# Patient Record
Sex: FEMALE | Race: WHITE | NOT HISPANIC OR LATINO | URBAN - METROPOLITAN AREA
[De-identification: names, ages, dates, MRNs, and addresses within clinical notes are randomized per-mention and may not be internally consistent; named-entity substitution may affect disease eponyms.]

---

## 2023-07-07 ENCOUNTER — INPATIENT (INPATIENT)
Facility: HOSPITAL | Age: 78
LOS: 0 days | Discharge: HOME | DRG: 149 | End: 2023-07-08
Attending: STUDENT IN AN ORGANIZED HEALTH CARE EDUCATION/TRAINING PROGRAM | Admitting: STUDENT IN AN ORGANIZED HEALTH CARE EDUCATION/TRAINING PROGRAM
Payer: MEDICARE

## 2023-07-07 VITALS
DIASTOLIC BLOOD PRESSURE: 90 MMHG | HEART RATE: 74 BPM | WEIGHT: 149.91 LBS | RESPIRATION RATE: 16 BRPM | TEMPERATURE: 99 F | SYSTOLIC BLOOD PRESSURE: 145 MMHG | HEIGHT: 66 IN | OXYGEN SATURATION: 99 %

## 2023-07-07 DIAGNOSIS — Z29.9 ENCOUNTER FOR PROPHYLACTIC MEASURES, UNSPECIFIED: ICD-10-CM

## 2023-07-07 DIAGNOSIS — R73.9 HYPERGLYCEMIA, UNSPECIFIED: ICD-10-CM

## 2023-07-07 DIAGNOSIS — E78.5 HYPERLIPIDEMIA, UNSPECIFIED: ICD-10-CM

## 2023-07-07 DIAGNOSIS — R42 DIZZINESS AND GIDDINESS: ICD-10-CM

## 2023-07-07 DIAGNOSIS — I10 ESSENTIAL (PRIMARY) HYPERTENSION: ICD-10-CM

## 2023-07-07 LAB
ALBUMIN SERPL ELPH-MCNC: 3.8 G/DL — SIGNIFICANT CHANGE UP (ref 3.3–5)
ALP SERPL-CCNC: 45 U/L — SIGNIFICANT CHANGE UP (ref 40–120)
ALT FLD-CCNC: 22 U/L — SIGNIFICANT CHANGE UP (ref 12–78)
ANION GAP SERPL CALC-SCNC: 7 MMOL/L — SIGNIFICANT CHANGE UP (ref 5–17)
APTT BLD: 30 SEC — SIGNIFICANT CHANGE UP (ref 27.5–35.5)
AST SERPL-CCNC: 18 U/L — SIGNIFICANT CHANGE UP (ref 15–37)
BASOPHILS # BLD AUTO: 0.03 K/UL — SIGNIFICANT CHANGE UP (ref 0–0.2)
BASOPHILS NFR BLD AUTO: 0.4 % — SIGNIFICANT CHANGE UP (ref 0–2)
BILIRUB SERPL-MCNC: 0.3 MG/DL — SIGNIFICANT CHANGE UP (ref 0.2–1.2)
BUN SERPL-MCNC: 21 MG/DL — SIGNIFICANT CHANGE UP (ref 7–23)
CALCIUM SERPL-MCNC: 9.5 MG/DL — SIGNIFICANT CHANGE UP (ref 8.5–10.1)
CHLORIDE SERPL-SCNC: 107 MMOL/L — SIGNIFICANT CHANGE UP (ref 96–108)
CO2 SERPL-SCNC: 29 MMOL/L — SIGNIFICANT CHANGE UP (ref 22–31)
CREAT SERPL-MCNC: 0.76 MG/DL — SIGNIFICANT CHANGE UP (ref 0.5–1.3)
EGFR: 82 ML/MIN/1.73M2 — SIGNIFICANT CHANGE UP
EOSINOPHIL # BLD AUTO: 0.13 K/UL — SIGNIFICANT CHANGE UP (ref 0–0.5)
EOSINOPHIL NFR BLD AUTO: 1.8 % — SIGNIFICANT CHANGE UP (ref 0–6)
GLUCOSE SERPL-MCNC: 165 MG/DL — HIGH (ref 70–99)
HCT VFR BLD CALC: 37.4 % — SIGNIFICANT CHANGE UP (ref 34.5–45)
HGB BLD-MCNC: 12.2 G/DL — SIGNIFICANT CHANGE UP (ref 11.5–15.5)
IMM GRANULOCYTES NFR BLD AUTO: 0.4 % — SIGNIFICANT CHANGE UP (ref 0–0.9)
INR BLD: 1.05 RATIO — SIGNIFICANT CHANGE UP (ref 0.88–1.16)
LYMPHOCYTES # BLD AUTO: 3.04 K/UL — SIGNIFICANT CHANGE UP (ref 1–3.3)
LYMPHOCYTES # BLD AUTO: 41.4 % — SIGNIFICANT CHANGE UP (ref 13–44)
MCHC RBC-ENTMCNC: 30.3 PG — SIGNIFICANT CHANGE UP (ref 27–34)
MCHC RBC-ENTMCNC: 32.6 GM/DL — SIGNIFICANT CHANGE UP (ref 32–36)
MCV RBC AUTO: 93 FL — SIGNIFICANT CHANGE UP (ref 80–100)
MONOCYTES # BLD AUTO: 0.66 K/UL — SIGNIFICANT CHANGE UP (ref 0–0.9)
MONOCYTES NFR BLD AUTO: 9 % — SIGNIFICANT CHANGE UP (ref 2–14)
NEUTROPHILS # BLD AUTO: 3.46 K/UL — SIGNIFICANT CHANGE UP (ref 1.8–7.4)
NEUTROPHILS NFR BLD AUTO: 47 % — SIGNIFICANT CHANGE UP (ref 43–77)
NRBC # BLD: 0 /100 WBCS — SIGNIFICANT CHANGE UP (ref 0–0)
PLATELET # BLD AUTO: 213 K/UL — SIGNIFICANT CHANGE UP (ref 150–400)
POTASSIUM SERPL-MCNC: 3.7 MMOL/L — SIGNIFICANT CHANGE UP (ref 3.5–5.3)
POTASSIUM SERPL-SCNC: 3.7 MMOL/L — SIGNIFICANT CHANGE UP (ref 3.5–5.3)
PROT SERPL-MCNC: 7.3 G/DL — SIGNIFICANT CHANGE UP (ref 6–8.3)
PROTHROM AB SERPL-ACNC: 12.3 SEC — SIGNIFICANT CHANGE UP (ref 10.5–13.4)
RBC # BLD: 4.02 M/UL — SIGNIFICANT CHANGE UP (ref 3.8–5.2)
RBC # FLD: 11.6 % — SIGNIFICANT CHANGE UP (ref 10.3–14.5)
SODIUM SERPL-SCNC: 143 MMOL/L — SIGNIFICANT CHANGE UP (ref 135–145)
TROPONIN I, HIGH SENSITIVITY RESULT: 3.5 NG/L — SIGNIFICANT CHANGE UP
WBC # BLD: 7.35 K/UL — SIGNIFICANT CHANGE UP (ref 3.8–10.5)
WBC # FLD AUTO: 7.35 K/UL — SIGNIFICANT CHANGE UP (ref 3.8–10.5)

## 2023-07-07 PROCEDURE — 70498 CT ANGIOGRAPHY NECK: CPT | Mod: 26,MA

## 2023-07-07 PROCEDURE — 99223 1ST HOSP IP/OBS HIGH 75: CPT

## 2023-07-07 PROCEDURE — 71045 X-RAY EXAM CHEST 1 VIEW: CPT | Mod: 26

## 2023-07-07 PROCEDURE — 70496 CT ANGIOGRAPHY HEAD: CPT | Mod: 26,MA

## 2023-07-07 PROCEDURE — 99291 CRITICAL CARE FIRST HOUR: CPT

## 2023-07-07 PROCEDURE — 0042T: CPT | Mod: MA

## 2023-07-07 PROCEDURE — 93010 ELECTROCARDIOGRAM REPORT: CPT

## 2023-07-07 RX ORDER — ASPIRIN/CALCIUM CARB/MAGNESIUM 324 MG
325 TABLET ORAL ONCE
Refills: 0 | Status: COMPLETED | OUTPATIENT
Start: 2023-07-07 | End: 2023-07-07

## 2023-07-07 RX ORDER — ATORVASTATIN CALCIUM 80 MG/1
40 TABLET, FILM COATED ORAL AT BEDTIME
Refills: 0 | Status: DISCONTINUED | OUTPATIENT
Start: 2023-07-07 | End: 2023-07-08

## 2023-07-07 RX ORDER — MECLIZINE HCL 12.5 MG
25 TABLET ORAL THREE TIMES A DAY
Refills: 0 | Status: DISCONTINUED | OUTPATIENT
Start: 2023-07-07 | End: 2023-07-08

## 2023-07-07 RX ORDER — ACETAMINOPHEN 500 MG
650 TABLET ORAL EVERY 6 HOURS
Refills: 0 | Status: DISCONTINUED | OUTPATIENT
Start: 2023-07-07 | End: 2023-07-08

## 2023-07-07 RX ORDER — SODIUM CHLORIDE 9 MG/ML
1000 INJECTION INTRAMUSCULAR; INTRAVENOUS; SUBCUTANEOUS
Refills: 0 | Status: DISCONTINUED | OUTPATIENT
Start: 2023-07-07 | End: 2023-07-08

## 2023-07-07 RX ORDER — LOSARTAN POTASSIUM 100 MG/1
25 TABLET, FILM COATED ORAL DAILY
Refills: 0 | Status: DISCONTINUED | OUTPATIENT
Start: 2023-07-08 | End: 2023-07-08

## 2023-07-07 RX ORDER — ONDANSETRON 8 MG/1
4 TABLET, FILM COATED ORAL EVERY 8 HOURS
Refills: 0 | Status: DISCONTINUED | OUTPATIENT
Start: 2023-07-07 | End: 2023-07-08

## 2023-07-07 RX ORDER — ONDANSETRON 8 MG/1
4 TABLET, FILM COATED ORAL ONCE
Refills: 0 | Status: COMPLETED | OUTPATIENT
Start: 2023-07-07 | End: 2023-07-07

## 2023-07-07 RX ORDER — SODIUM CHLORIDE 9 MG/ML
1000 INJECTION INTRAMUSCULAR; INTRAVENOUS; SUBCUTANEOUS ONCE
Refills: 0 | Status: COMPLETED | OUTPATIENT
Start: 2023-07-07 | End: 2023-07-07

## 2023-07-07 RX ORDER — MECLIZINE HCL 12.5 MG
25 TABLET ORAL ONCE
Refills: 0 | Status: COMPLETED | OUTPATIENT
Start: 2023-07-07 | End: 2023-07-07

## 2023-07-07 RX ORDER — LANOLIN ALCOHOL/MO/W.PET/CERES
3 CREAM (GRAM) TOPICAL AT BEDTIME
Refills: 0 | Status: DISCONTINUED | OUTPATIENT
Start: 2023-07-07 | End: 2023-07-08

## 2023-07-07 RX ADMIN — Medication 25 MILLIGRAM(S): at 21:38

## 2023-07-07 RX ADMIN — SODIUM CHLORIDE 1000 MILLILITER(S): 9 INJECTION INTRAMUSCULAR; INTRAVENOUS; SUBCUTANEOUS at 20:00

## 2023-07-07 RX ADMIN — ONDANSETRON 4 MILLIGRAM(S): 8 TABLET, FILM COATED ORAL at 22:29

## 2023-07-07 RX ADMIN — ONDANSETRON 4 MILLIGRAM(S): 8 TABLET, FILM COATED ORAL at 20:00

## 2023-07-07 RX ADMIN — Medication 325 MILLIGRAM(S): at 23:14

## 2023-07-07 RX ADMIN — SODIUM CHLORIDE 1000 MILLILITER(S): 9 INJECTION INTRAMUSCULAR; INTRAVENOUS; SUBCUTANEOUS at 22:20

## 2023-07-07 NOTE — H&P ADULT - HISTORY OF PRESENT ILLNESS
77y female with PMHx of HTN, HLD presented to the ED with acute onset dizziness and nausea/vomiting. Reports the sensation as her head spinning. Never had vertigo in the past.    In the ED,  Vital Signs T(F): 98.8 HR: 74 BP: 145/90 RR: 16 SpO2: 99%  Labs were unremarkable. Glucose 165.  CXR - no acute lobar pneumonia on personal review  CT head: No acute intracranial hemorrhage or acute territorial infarct.   CTA head/neck: No hemodynamically significant stenosis  CT perfusion: Unremarkable CT perfusion  EKG: NSR at 75bpm, low voltage QRS  Telestroke consulted by ER and recommended admission for MRI 77y female with PMHx of HTN, HLD presented to the ED with acute onset dizziness and nausea/vomiting. Reports the sensation as her head spinning. Never had vertigo in the past. States that she was sitting outside around 6PM when she had a sudden attack of the dizziness. Felt very sick and diaphoretic. States that she had 3 days of abnormal hearing in her right ear which has now resolved. No tinnitus. Feels much improved after fluids and meclizine. Now able to turn her head without getting too dizzy. Still a bit nauseous. Has unsteady gait, stating that she was not able to walk at all. Denies chest pain, palpitations, dyspnea, headache, abdominal pain, diarrhea. Patient has a remote history of smoking. States that her mother passed away from a stroke.    In the ED,  Vital Signs T(F): 98.8 HR: 74 BP: 145/90 RR: 16 SpO2: 99%  Labs were unremarkable. Glucose 165.  CXR - no acute lobar pneumonia on personal review  CT head: No acute intracranial hemorrhage or acute territorial infarct.   CTA head/neck: No hemodynamically significant stenosis  CT perfusion: Unremarkable CT perfusion  EKG: NSR at 75bpm, low voltage QRS  Telestroke consulted by ER and recommended admission for MRI 77y female with PMHx of HTN, HLD presented to the ED with acute onset dizziness and nausea/vomiting. Reports the sensation as her head spinning. Never had vertigo in the past. States that she was sitting outside around 6PM when she had a sudden attack of the dizziness. Felt very sick and diaphoretic. States that she had 3 days of abnormal hearing in her right ear which has now resolved. No tinnitus. Feels much improved after fluids and meclizine. Now able to turn her head without getting too dizzy. Still a bit nauseous. Has unsteady gait, stating that she was not able to walk at all. Denies chest pain, palpitations, dyspnea, headache, abdominal pain, diarrhea. Remote smoking history. Mother passed away from a stroke.    In the ED,  Vital Signs T(F): 98.8 HR: 74 BP: 145/90 RR: 16 SpO2: 99%  Labs were unremarkable. Glucose 165.  CXR - no acute lobar pneumonia on personal review  CT head: No acute intracranial hemorrhage or acute territorial infarct.   CTA head/neck: No hemodynamically significant stenosis  CT perfusion: Unremarkable CT perfusion  EKG: NSR at 75bpm, low voltage QRS  Telestroke consulted by ER and recommended admission for MRI

## 2023-07-07 NOTE — H&P ADULT - ASSESSMENT
77y female with PMHx of HTN, HLD admitted with dizziness and vertigo with unsteady gait. 77y female with PMHx of HTN, HLD admitted with dizziness and vertigo with unsteady gait. Likely BPPV, r/o central cause.

## 2023-07-07 NOTE — ED ADULT NURSE NOTE - OBJECTIVE STATEMENT
pt is A&Ox4, neuro intact, sensation +, motorstrength+, iv placed, diagnostic tests performed, meds given as per Md orders, resp even and unlabored, nad noted, will continue to monitor

## 2023-07-07 NOTE — ED ADULT TRIAGE NOTE - CHIEF COMPLAINT QUOTE
sudden onset of dizziness with nausea, unable to lift head- feels dizzy on movement, has vomiting and diarrhea also

## 2023-07-07 NOTE — ED ADULT NURSE NOTE - NSFALLUNIVINTERV_ED_ALL_ED
Bed/Stretcher in lowest position, wheels locked, appropriate side rails in place/Call bell, personal items and telephone in reach/Instruct patient to call for assistance before getting out of bed/chair/stretcher/Non-slip footwear applied when patient is off stretcher/[place] to call system/Physically safe environment - no spills, clutter or unnecessary equipment/Purposeful proactive rounding/Room/bathroom lighting operational, light cord in reach

## 2023-07-07 NOTE — H&P ADULT - NSHPSOCIALHISTORY_GEN_ALL_CORE
, lives at home with . Former smoker, quit many years ago. Rare/occasional wine use. No illicit drug use. Ambulates independently at baseline.

## 2023-07-07 NOTE — H&P ADULT - PROBLEM SELECTOR PLAN 1
Admit to medicine with remote telemetry  Suspect vertigo, CT imaging reviewed and negative  Persistent symptoms and unsteady gait despite medications  Will check MRI head to rule out central cause  Neurochecks q4  Check A1C and lipid profile  Meclizine TID  PT consult  Neuro evaluation Admit to medicine with remote telemetry  Suspect vertigo, CT imaging reviewed and negative  Patient reports R ear symptoms for 3 days prior to this episode (resolved hearing loss)  Persistent symptoms and unsteady gait despite medications  Will check MRI head to rule out central cause  Neurochecks q4  Check A1C and lipid profile  Meclizine TID and antiemetics PRN  Continue gentle IV fluids until tolerating diet without nausea  PT consult  Telestroke Dr. Hale consulted by ER

## 2023-07-07 NOTE — ED PROVIDER NOTE - OBJECTIVE STATEMENT
Patient is a 77-year-old female with past medical history hypertension and hyperlipidemia brought in by EMS for sudden onset of dizziness described as spinning sensation worse with movement head with associated nausea and vomiting.  Patient states symptoms started at 6:30 PM while she was sitting outside as per family patient states he has not been working for the last 3 days.  Patient denies any chest pain shortness of breath abdominal pain fever chills blurry vision numbness tingling weakness trouble with speech or gait.  Patient states she is never had the symptoms before.  Patient denies any trauma or falls no blood thinner use.

## 2023-07-07 NOTE — ED ADULT NURSE REASSESSMENT NOTE - NSFALLRISKINTERV_ED_ALL_ED

## 2023-07-07 NOTE — H&P ADULT - NSHPPHYSICALEXAM_GEN_ALL_CORE
T(C): 37.1 (07-07-23 @ 19:57), Max: 37.1 (07-07-23 @ 19:57)  HR: 74 (07-07-23 @ 19:57) (74 - 74)  BP: 145/90 (07-07-23 @ 19:57) (145/90 - 145/90)  RR: 16 (07-07-23 @ 19:57) (16 - 16)  SpO2: 99% (07-07-23 @ 19:57) (99% - 99%)    General: No apparent distress, pleasant  Head: normocephalic, atraumatic  Eyes: EOMI, anicteric, NO nystagmus  ENT: R ear TM intact, moist mucous membranes, no pharyngeal exudates  Heart: RRR, S1, S2, no murmurs  Chest: CTA b/l, no rales, rhonchi, or wheezes  Abd: BS+, soft, NT, ND  Back: no tenderness or deformity  Extr: no edema or cyanosis  Skin: warm, well perfused  Neuro: AA&Ox3, no focal weakness, sensation to light touch intact  Psych: normal affect

## 2023-07-07 NOTE — ED PROVIDER NOTE - PROGRESS NOTE DETAILS
Patient with some persistent vertigo, otherwise no acute findings.  PA discussed with telestroke, Dr. Hale.  They recommend aspirin, admission for MRI.  They do not recommend tenecteplase at this time. Patient with some improvement, still with persistent symptoms we will continue to monitor. Discussed with Dr. Talbot, will see patient to admit.

## 2023-07-07 NOTE — H&P ADULT - NSHPREVIEWOFSYSTEMS_GEN_ALL_CORE
General: no fever, chills; ADMITS malaise and diaphoresis  Eyes: no vision changes  ENT: no nasal congestion, or sore throat; ADMITTED transient hearing changes in R ear (now resolved)  CV: no chest pain or palpitations  Pulm: no SOB, wheezing, cough, or hemoptysis  Abd/GI: no diarrhea, constipation, abd pain; ADMITS n/v  : no dysuria, hematuria, urinary frequency  MSK: no joint pain or myalgias  Neuro: no syncope, focal weakness; ADMITS dizziness & vertigo  Psych: no anxiety or depression  Endo: no heat or cold intolerance

## 2023-07-07 NOTE — ED PROVIDER NOTE - CLINICAL SUMMARY MEDICAL DECISION MAKING FREE TEXT BOX
77-year-old female with a history of hypertension, hyperlipidemia presents with sudden onset of dizziness which she describes as spinning, worse with movement at 6:30 PM today.  Some nausea and vomiting associated with symptoms.  No chest pain or shortness of breath.  No acute headache.  No numbness/tingling/focal weakness.  No recent fall or trauma.  No neck pain or stiffness.  No photophobia.  No cough/URI.  No known COVID exposure.  No aggravating or alleviating factors otherwise noted.  No other acute injury or complaints.  Exam: Nontoxic, well-appearing.  CTA BL, no W/R/R.  Normal cardiac exam.  Abdomen soft, nontender, nondistended.  Normal nonfocal detailed neurologic exam.  EOMI.  Some nystagmus with right lateral gaze, horizontal.  Normal distal strength and sensation equal bilaterally.  2+ pulses.  Normal cap refill.  No other acute findings on exam.  NIHSS equals 0  Acute vertigo x630, otherwise neurologically intact with a normal NIH stroke scale.  Will check labs, CT/CT angio, IV, admit.

## 2023-07-08 ENCOUNTER — TRANSCRIPTION ENCOUNTER (OUTPATIENT)
Age: 78
End: 2023-07-08

## 2023-07-08 VITALS
OXYGEN SATURATION: 98 % | DIASTOLIC BLOOD PRESSURE: 79 MMHG | RESPIRATION RATE: 18 BRPM | TEMPERATURE: 98 F | SYSTOLIC BLOOD PRESSURE: 163 MMHG | HEART RATE: 76 BPM

## 2023-07-08 DIAGNOSIS — Z90.711 ACQUIRED ABSENCE OF UTERUS WITH REMAINING CERVICAL STUMP: Chronic | ICD-10-CM

## 2023-07-08 DIAGNOSIS — Z98.890 OTHER SPECIFIED POSTPROCEDURAL STATES: Chronic | ICD-10-CM

## 2023-07-08 LAB
A1C WITH ESTIMATED AVERAGE GLUCOSE RESULT: 5.5 % — SIGNIFICANT CHANGE UP (ref 4–5.6)
ALBUMIN SERPL ELPH-MCNC: 3.4 G/DL — SIGNIFICANT CHANGE UP (ref 3.3–5)
ALP SERPL-CCNC: 41 U/L — SIGNIFICANT CHANGE UP (ref 40–120)
ALT FLD-CCNC: 21 U/L — SIGNIFICANT CHANGE UP (ref 12–78)
ANION GAP SERPL CALC-SCNC: 6 MMOL/L — SIGNIFICANT CHANGE UP (ref 5–17)
AST SERPL-CCNC: 16 U/L — SIGNIFICANT CHANGE UP (ref 15–37)
BASOPHILS # BLD AUTO: 0.03 K/UL — SIGNIFICANT CHANGE UP (ref 0–0.2)
BASOPHILS NFR BLD AUTO: 0.4 % — SIGNIFICANT CHANGE UP (ref 0–2)
BILIRUB SERPL-MCNC: 0.3 MG/DL — SIGNIFICANT CHANGE UP (ref 0.2–1.2)
BUN SERPL-MCNC: 14 MG/DL — SIGNIFICANT CHANGE UP (ref 7–23)
CALCIUM SERPL-MCNC: 8.9 MG/DL — SIGNIFICANT CHANGE UP (ref 8.5–10.1)
CHLORIDE SERPL-SCNC: 108 MMOL/L — SIGNIFICANT CHANGE UP (ref 96–108)
CHOLEST SERPL-MCNC: 256 MG/DL — HIGH
CO2 SERPL-SCNC: 30 MMOL/L — SIGNIFICANT CHANGE UP (ref 22–31)
CREAT SERPL-MCNC: 0.76 MG/DL — SIGNIFICANT CHANGE UP (ref 0.5–1.3)
EGFR: 82 ML/MIN/1.73M2 — SIGNIFICANT CHANGE UP
EOSINOPHIL # BLD AUTO: 0.06 K/UL — SIGNIFICANT CHANGE UP (ref 0–0.5)
EOSINOPHIL NFR BLD AUTO: 0.8 % — SIGNIFICANT CHANGE UP (ref 0–6)
ESTIMATED AVERAGE GLUCOSE: 111 MG/DL — SIGNIFICANT CHANGE UP (ref 68–114)
FOLATE SERPL-MCNC: >20 NG/ML — SIGNIFICANT CHANGE UP
GLUCOSE SERPL-MCNC: 144 MG/DL — HIGH (ref 70–99)
HCT VFR BLD CALC: 35.3 % — SIGNIFICANT CHANGE UP (ref 34.5–45)
HCV AB S/CO SERPL IA: 0.05 S/CO — SIGNIFICANT CHANGE UP (ref 0–0.99)
HCV AB SERPL-IMP: SIGNIFICANT CHANGE UP
HDLC SERPL-MCNC: 55 MG/DL — SIGNIFICANT CHANGE UP
HGB BLD-MCNC: 11.7 G/DL — SIGNIFICANT CHANGE UP (ref 11.5–15.5)
IMM GRANULOCYTES NFR BLD AUTO: 0.4 % — SIGNIFICANT CHANGE UP (ref 0–0.9)
LIPID PNL WITH DIRECT LDL SERPL: 177 MG/DL — HIGH
LYMPHOCYTES # BLD AUTO: 1.5 K/UL — SIGNIFICANT CHANGE UP (ref 1–3.3)
LYMPHOCYTES # BLD AUTO: 19.5 % — SIGNIFICANT CHANGE UP (ref 13–44)
MAGNESIUM SERPL-MCNC: 2.1 MG/DL — SIGNIFICANT CHANGE UP (ref 1.6–2.6)
MCHC RBC-ENTMCNC: 30.9 PG — SIGNIFICANT CHANGE UP (ref 27–34)
MCHC RBC-ENTMCNC: 33.1 GM/DL — SIGNIFICANT CHANGE UP (ref 32–36)
MCV RBC AUTO: 93.1 FL — SIGNIFICANT CHANGE UP (ref 80–100)
MONOCYTES # BLD AUTO: 0.44 K/UL — SIGNIFICANT CHANGE UP (ref 0–0.9)
MONOCYTES NFR BLD AUTO: 5.7 % — SIGNIFICANT CHANGE UP (ref 2–14)
NEUTROPHILS # BLD AUTO: 5.63 K/UL — SIGNIFICANT CHANGE UP (ref 1.8–7.4)
NEUTROPHILS NFR BLD AUTO: 73.2 % — SIGNIFICANT CHANGE UP (ref 43–77)
NON HDL CHOLESTEROL: 201 MG/DL — HIGH
NRBC # BLD: 0 /100 WBCS — SIGNIFICANT CHANGE UP (ref 0–0)
PHOSPHATE SERPL-MCNC: 2.6 MG/DL — SIGNIFICANT CHANGE UP (ref 2.5–4.5)
PLATELET # BLD AUTO: 193 K/UL — SIGNIFICANT CHANGE UP (ref 150–400)
POTASSIUM SERPL-MCNC: 3.4 MMOL/L — LOW (ref 3.5–5.3)
POTASSIUM SERPL-SCNC: 3.4 MMOL/L — LOW (ref 3.5–5.3)
PROT SERPL-MCNC: 6.9 G/DL — SIGNIFICANT CHANGE UP (ref 6–8.3)
RBC # BLD: 3.79 M/UL — LOW (ref 3.8–5.2)
RBC # FLD: 11.7 % — SIGNIFICANT CHANGE UP (ref 10.3–14.5)
SODIUM SERPL-SCNC: 144 MMOL/L — SIGNIFICANT CHANGE UP (ref 135–145)
TRIGL SERPL-MCNC: 118 MG/DL — SIGNIFICANT CHANGE UP
TSH SERPL-MCNC: 1.32 UIU/ML — SIGNIFICANT CHANGE UP (ref 0.36–3.74)
VIT B12 SERPL-MCNC: 1493 PG/ML — HIGH (ref 232–1245)
WBC # BLD: 7.69 K/UL — SIGNIFICANT CHANGE UP (ref 3.8–10.5)
WBC # FLD AUTO: 7.69 K/UL — SIGNIFICANT CHANGE UP (ref 3.8–10.5)

## 2023-07-08 PROCEDURE — 70551 MRI BRAIN STEM W/O DYE: CPT | Mod: MG

## 2023-07-08 PROCEDURE — 86803 HEPATITIS C AB TEST: CPT

## 2023-07-08 PROCEDURE — 70551 MRI BRAIN STEM W/O DYE: CPT | Mod: 26

## 2023-07-08 PROCEDURE — 82746 ASSAY OF FOLIC ACID SERUM: CPT

## 2023-07-08 PROCEDURE — 93005 ELECTROCARDIOGRAM TRACING: CPT

## 2023-07-08 PROCEDURE — 85025 COMPLETE CBC W/AUTO DIFF WBC: CPT

## 2023-07-08 PROCEDURE — 83036 HEMOGLOBIN GLYCOSYLATED A1C: CPT

## 2023-07-08 PROCEDURE — 96361 HYDRATE IV INFUSION ADD-ON: CPT

## 2023-07-08 PROCEDURE — 84100 ASSAY OF PHOSPHORUS: CPT

## 2023-07-08 PROCEDURE — 82962 GLUCOSE BLOOD TEST: CPT

## 2023-07-08 PROCEDURE — 70498 CT ANGIOGRAPHY NECK: CPT | Mod: MA

## 2023-07-08 PROCEDURE — 70450 CT HEAD/BRAIN W/O DYE: CPT | Mod: MA

## 2023-07-08 PROCEDURE — 0042T: CPT | Mod: MA

## 2023-07-08 PROCEDURE — 80053 COMPREHEN METABOLIC PANEL: CPT

## 2023-07-08 PROCEDURE — 82607 VITAMIN B-12: CPT

## 2023-07-08 PROCEDURE — G0378: CPT

## 2023-07-08 PROCEDURE — 80061 LIPID PANEL: CPT

## 2023-07-08 PROCEDURE — 84443 ASSAY THYROID STIM HORMONE: CPT

## 2023-07-08 PROCEDURE — G1004: CPT

## 2023-07-08 PROCEDURE — 96376 TX/PRO/DX INJ SAME DRUG ADON: CPT

## 2023-07-08 PROCEDURE — 97161 PT EVAL LOW COMPLEX 20 MIN: CPT

## 2023-07-08 PROCEDURE — 84484 ASSAY OF TROPONIN QUANT: CPT

## 2023-07-08 PROCEDURE — 36415 COLL VENOUS BLD VENIPUNCTURE: CPT

## 2023-07-08 PROCEDURE — 99291 CRITICAL CARE FIRST HOUR: CPT | Mod: 25

## 2023-07-08 PROCEDURE — 70496 CT ANGIOGRAPHY HEAD: CPT | Mod: MA

## 2023-07-08 PROCEDURE — 96374 THER/PROPH/DIAG INJ IV PUSH: CPT

## 2023-07-08 PROCEDURE — 85730 THROMBOPLASTIN TIME PARTIAL: CPT

## 2023-07-08 PROCEDURE — 83735 ASSAY OF MAGNESIUM: CPT

## 2023-07-08 PROCEDURE — 71045 X-RAY EXAM CHEST 1 VIEW: CPT

## 2023-07-08 PROCEDURE — 85610 PROTHROMBIN TIME: CPT

## 2023-07-08 RX ORDER — MECLIZINE HCL 12.5 MG
1 TABLET ORAL
Qty: 15 | Refills: 0
Start: 2023-07-08

## 2023-07-08 RX ORDER — ENOXAPARIN SODIUM 100 MG/ML
40 INJECTION SUBCUTANEOUS EVERY 24 HOURS
Refills: 0 | Status: DISCONTINUED | OUTPATIENT
Start: 2023-07-08 | End: 2023-07-08

## 2023-07-08 RX ORDER — ONDANSETRON 8 MG/1
1 TABLET, FILM COATED ORAL
Qty: 2 | Refills: 0
Start: 2023-07-08

## 2023-07-08 RX ADMIN — LOSARTAN POTASSIUM 25 MILLIGRAM(S): 100 TABLET, FILM COATED ORAL at 06:32

## 2023-07-08 RX ADMIN — Medication 650 MILLIGRAM(S): at 07:27

## 2023-07-08 RX ADMIN — Medication 25 MILLIGRAM(S): at 06:32

## 2023-07-08 RX ADMIN — Medication 25 MILLIGRAM(S): at 13:10

## 2023-07-08 RX ADMIN — ENOXAPARIN SODIUM 40 MILLIGRAM(S): 100 INJECTION SUBCUTANEOUS at 13:09

## 2023-07-08 RX ADMIN — SODIUM CHLORIDE 75 MILLILITER(S): 9 INJECTION INTRAMUSCULAR; INTRAVENOUS; SUBCUTANEOUS at 06:32

## 2023-07-08 RX ADMIN — Medication 650 MILLIGRAM(S): at 06:32

## 2023-07-08 NOTE — DISCHARGE NOTE PROVIDER - CARE PROVIDER_API CALL
[facility]  [address] [place] [address]  Phone: [phone]  Fax: [phone]  Follow Up Time:

## 2023-07-08 NOTE — DISCHARGE NOTE NURSING/CASE MANAGEMENT/SOCIAL WORK - PATIENT PORTAL LINK FT
You can access the FollowMyHealth Patient Portal offered by [facility] by registering at the following website: [email or web address]. By joining [facility]’s FollowMyHealth portal, you will also be able to view your health information using other applications (apps) compatible with our system.

## 2023-07-08 NOTE — PHYSICAL THERAPY INITIAL EVALUATION ADULT - NSPTDISCHREC_GEN_A_CORE
Patient demonstrating safe ambulation. Patient appears to be performing at functional baseline and will not be placed on PT program. Pt encouraged to ambulate on unit with nursing staff. Pt verbalized understanding.

## 2023-07-08 NOTE — PHYSICAL THERAPY INITIAL EVALUATION ADULT - PERTINENT HX OF CURRENT PROBLEM, REHAB EVAL
Patient is a 77 year old female admitted with acute onset dizziness and nausea/vomiting. PMH HTN, HLD. MR Head: No acute infarct, hemorrhage, or mass effect.

## 2023-07-08 NOTE — PATIENT PROFILE ADULT - FALL HARM RISK - HARM RISK INTERVENTIONS
Assistance with ambulation/Assistance OOB with selected safe patient handling equipment/Communicate Risk of Fall with Harm to all staff/Monitor gait and stability/Reinforce activity limits and safety measures with patient and family/Sit up slowly, dangle for a short time, stand at bedside before walking/Tailored Fall Risk Interventions/Visual Cue: Yellow wristband and red socks/Bed in lowest position, wheels locked, appropriate side rails in place/Call bell, personal items and telephone in reach/Instruct patient to call for assistance before getting out of bed or chair/Non-slip footwear when patient is out of bed/[place] to call system/Physically safe environment - no spills, clutter or unnecessary equipment/Purposeful Proactive Rounding/Room/bathroom lighting operational, light cord in reach

## 2023-07-08 NOTE — PATIENT PROFILE ADULT - NSPROIMPLANTSMEDDEV_GEN_A_NUR
Right ankle removal of painful hardware. See operative report for complete details. right breast tag from biopsy

## 2023-07-08 NOTE — PHYSICAL THERAPY INITIAL EVALUATION ADULT - ADDITIONAL COMMENTS
Patient lives with  in a private house, 3 steps to enter with bilateral handrails. Pt can stay on first level. Patient reports being independent in ADLs and ambulation prior to admission. Pt owns [employer].

## 2023-07-08 NOTE — ED ADULT NURSE REASSESSMENT NOTE - NS ED NURSE REASSESS COMMENT FT1
Pt states feeling better.  Await MRi.
11
Assumed care of Pt from previous RN, Pt more comfortable at this time, not feeling dizzy, resting on stretcher, awaiting admission bed placement, VSS, will continue to monitor closely.

## 2023-07-08 NOTE — DISCHARGE NOTE PROVIDER - HOSPITAL COURSE
77y female with PMHx of HTN, HLD presented to the ED with acute onset dizziness and nausea/vomiting. Reports the sensation as her head spinning. Never had vertigo in the past. States that she was sitting outside around 6PM when she had a sudden attack of the dizziness. Felt very sick and diaphoretic. States that she had 3 days of abnormal hearing in her right ear which has now resolved. No tinnitus. Feels much improved after fluids and meclizine. Now able to turn her head without getting too dizzy. Still a bit nauseous. Has unsteady gait, stating that she was not able to walk at all. Denies chest pain, palpitations, dyspnea, headache, abdominal pain, diarrhea. Remote smoking history. Mother passed away from a stroke.  Telestroke consulted by ER and recommended admission for MRI    LABS:             11.7   7.69  )-----------( 193      ( 08 Jul 2023 09:50 )             35.3   07-08  144  |  108  |  14  ----------------------------<  144<H>  3.4<L>   |  30  |  0.76  Ca    8.9      08 Jul 2023 09:50  Phos  2.6     07-08  Mg     2.1     07-08  TPro  6.9  /  Alb  3.4  /  TBili  0.3  /  DBili  x   /  AST  16  /  ALT  21  /  AlkPhos  41  07-08  PT/INR - ( 07 Jul 2023 20:05 )   PT: 12.3 sec;   INR: 1.05 ratio    PTT - ( 07 Jul 2023 20:05 )  PTT:30.0 sec  CAPILLARY BLOOD GLUCOSE  POCT Blood Glucose.: 161 mg/dL (07 Jul 2023 20:03)  Urinalysis Basic - ( 08 Jul 2023 09:50 )  Color: x / Appearance: x / SG: x / pH: x  Gluc: 144 mg/dL / Ketone: x  / Bili: x / Urobili: x   Blood: x / Protein: x / Nitrite: x   Leuk Esterase: x / RBC: x / WBC x   Sq Epi: x / Non Sq Epi: x / Bacteria: x

## 2023-07-08 NOTE — DISCHARGE NOTE NURSING/CASE MANAGEMENT/SOCIAL WORK - NSDCPEFALRISK_GEN_ALL_CORE
For information on Fall & Injury Prevention, visit: [email or web address] OR  [email or web address] OR  https://www.cdc.gov/steadi/patient.html

## 2023-07-08 NOTE — DISCHARGE NOTE PROVIDER - NSDCMRMEDTOKEN_GEN_ALL_CORE_FT
meclizine 25 mg oral tablet: 1 tab(s) orally 3 times a day as needed for  dizziness  ondansetron 4 mg oral tablet, disintegratin tab(s) orally 4 times a day as needed for  nausea  rosuvastatin 10 mg oral tablet: 1 tab(s) orally once a day  telmisartan 20 mg oral tablet: 1 tab(s) orally once a day

## 2023-07-08 NOTE — PATIENT PROFILE ADULT - VISION (WITH CORRECTIVE LENSES IF THE PATIENT USUALLY WEARS THEM):
Partially impaired: cannot see medication labels or newsprint, but can see obstacles in path, and the surrounding layout; can count fingers at arm's length
Brandie Luo-dtr

## 2023-07-08 NOTE — PHYSICAL THERAPY INITIAL EVALUATION ADULT - PATIENT PROFILE REVIEW, REHAB EVAL
PT orders received: out of bed with assistance. Consult with PING Wilburn, pt may participate in PT evaluation./yes

## 2023-07-10 PROBLEM — Z00.00 ENCOUNTER FOR PREVENTIVE HEALTH EXAMINATION: Status: ACTIVE | Noted: 2023-07-10

## 2023-07-10 PROBLEM — I10 ESSENTIAL (PRIMARY) HYPERTENSION: Chronic | Status: ACTIVE | Noted: 2023-07-08

## 2023-07-10 PROBLEM — E78.5 HYPERLIPIDEMIA, UNSPECIFIED: Chronic | Status: ACTIVE | Noted: 2023-07-08

## 2023-07-11 ENCOUNTER — NON-APPOINTMENT (OUTPATIENT)
Age: 78
End: 2023-07-11

## 2023-07-11 ENCOUNTER — APPOINTMENT (OUTPATIENT)
Dept: OTOLARYNGOLOGY | Facility: CLINIC | Age: 78
End: 2023-07-11
Payer: MEDICARE

## 2023-07-11 VITALS
WEIGHT: 150 LBS | SYSTOLIC BLOOD PRESSURE: 147 MMHG | DIASTOLIC BLOOD PRESSURE: 82 MMHG | HEART RATE: 79 BPM | BODY MASS INDEX: 24.11 KG/M2 | HEIGHT: 66 IN

## 2023-07-11 DIAGNOSIS — R42 DIZZINESS AND GIDDINESS: ICD-10-CM

## 2023-07-11 DIAGNOSIS — H91.91 UNSPECIFIED HEARING LOSS, RIGHT EAR: ICD-10-CM

## 2023-07-11 DIAGNOSIS — H61.21 IMPACTED CERUMEN, RIGHT EAR: ICD-10-CM

## 2023-07-11 PROCEDURE — G0268 REMOVAL OF IMPACTED WAX MD: CPT

## 2023-07-11 PROCEDURE — 92557 COMPREHENSIVE HEARING TEST: CPT

## 2023-07-11 PROCEDURE — 92550 TYMPANOMETRY & REFLEX THRESH: CPT

## 2023-07-11 PROCEDURE — 99204 OFFICE O/P NEW MOD 45 MIN: CPT | Mod: 25

## 2023-07-11 NOTE — HISTORY OF PRESENT ILLNESS
[de-identified] : Patient presents stating that her right ear felt clogged for 2 weeks, then she started experiencing dizziness along with the clogged feeling so she went to [facility] on Saturday. She states she had brain scan done at the hospital and it didn’t show anything. Patient was given Meclizine(last time she took it was on Sunday). She states she is not dizzy anymore. Patient states many years ago she had an episode of vertigo. She adds her hearing now seems affected. Patient states when she lays on her left side she cant hear out of her right ear. Denies tinnitus.

## 2023-07-11 NOTE — ASSESSMENT
[FreeTextEntry1] : Reviewed and reconciled medications, allergies, PMHx, PSHx, SocHx, FMHx \par \par Patient presents stating that her right ear felt clogged for 2 weeks, then she started experiencing dizziness along with the clogged feeling so she went to [facility] on Saturday. She states she had brain scan done at the hospital and it didn’t show anything. Patient was given Meclizine(last time she took it was on Sunday). She states she is not dizzy anymore. Patient states many years ago she had an episode of vertigo. She adds her hearing now seems affected. Patient states when she lays on her left side she cant hear out of her right ear. Denies tinnitus.\par \par CT Head and CTA head and neck 07/07/23:\par -normal\par \par MRI Head no contrast 07/08/23:\par -normal \par \par Physical Exam:\par -right ear: narrow ear canal. cerumen removed via curettage \par -left ear: larger canal, minimal wax \par -deviated septum\par -mildly inflamed turbinates \par -no nystagmus\par -horizontal head roll: negative\par -vertical head roll: negative\par -romberg: negative romberg but unsteady, more to the right than the left\par \par Audio:\par -right ear: 96% at 70 dB\par -left ear: 100% at 65 dB\par Tymps: Type a, au\par ETF: abnormal, au \par Right: severe rising to mild to moderate ess snhl 250-8khz\par \par Plan: cerumen removed bilaterally. Audio - results interpreted by Dr. Mccormick and reviewed with the patient. Prednisone prescribed. Steroid calender provided. FU 2 weeks.

## 2023-07-11 NOTE — PHYSICAL EXAM
[Midline] : trachea located in midline position [Normal] : no nystagmus [FreeTextEntry8] : narrow ear canal. cerumen removed via curettage  [FreeTextEntry9] : larger canal, minimal wax  [FreeTextEntry1] : -deviated septum\par -mildly inflamed turbinates  [] : Romberg test is negative [de-identified] : no nystagmus [de-identified] : negative romberg but unsteady, more to the right than the left\par

## 2023-07-11 NOTE — DATA REVIEWED
[de-identified] : Tymps: Type a, au\par ETF: abnormal, au \par Right: severe rising to mild to moderate ess snhl 250-8khz\par left: wnl to mild snhl 250-8khz

## 2023-07-11 NOTE — CONSULT LETTER
[Dear  ___] : Dear  [unfilled], [Consult Letter:] : I had the pleasure of evaluating your patient, [unfilled]. [Please see my note below.] : Please see my note below. [Consult Closing:] : Thank you very much for allowing me to participate in the care of this patient.  If you have any questions, please do not hesitate to contact me. [Sincerely,] : Sincerely, [FreeTextEntry1] : Julio Mccormick MD FACS

## 2023-07-31 ENCOUNTER — APPOINTMENT (OUTPATIENT)
Dept: OTOLARYNGOLOGY | Facility: CLINIC | Age: 78
End: 2023-07-31

## 2023-08-22 ENCOUNTER — APPOINTMENT (OUTPATIENT)
Dept: OTOLARYNGOLOGY | Facility: CLINIC | Age: 78
End: 2023-08-22
Payer: MEDICARE

## 2023-08-22 VITALS
WEIGHT: 150 LBS | HEIGHT: 66 IN | DIASTOLIC BLOOD PRESSURE: 84 MMHG | SYSTOLIC BLOOD PRESSURE: 134 MMHG | HEART RATE: 85 BPM | BODY MASS INDEX: 24.11 KG/M2

## 2023-08-22 PROCEDURE — 99213 OFFICE O/P EST LOW 20 MIN: CPT

## 2023-08-22 NOTE — CONSULT LETTER
[Dear  ___] : Dear  [unfilled], [Courtesy Letter:] : I had the pleasure of seeing your patient, [unfilled], in my office today. [Please see my note below.] : Please see my note below. [Consult Closing:] : Thank you very much for allowing me to participate in the care of this patient.  If you have any questions, please do not hesitate to contact me. [Sincerely,] : Sincerely, [FreeTextEntry3] : Julio Mccormick MD FACS

## 2023-08-22 NOTE — HISTORY OF PRESENT ILLNESS
[de-identified] : Ms. Martinez is a 77 year old lady who presents today due to PND on the right side with clogged feeling on the right ear with popping sensation. She sometimes feels her left ear is clogged too. She takes seudafed to relieve the PND.

## 2023-08-22 NOTE — ASSESSMENT
[FreeTextEntry1] : Reviewed and Reconciled the pmhx, fmhx, sochx, and medhx Ms. Martinez is a 77 year old lady who presents today due to PND on the right side and clogged feeling on the right ear with popping sensation. She sometimes feels her left ear is clogged too. She takes seudafed to relieve the PND. She took prednisone. She does not use nasal spray. She sometimes feels slightly off balance.   Physical Exam: deviated septum right inflamed turbinates   Plan: Dymista - 1 spray bilaterally BID, spray laterally. FU 6 weeks

## 2023-08-22 NOTE — ADDENDUM
[FreeTextEntry1] : Documented by Joy Addison acting as a scribe for Dr. Mccormick on [mm//dd/yyyy].   All Medical record entries made by the scribe were at my, Dr. Mccormick, direction and personally directed by me on 08/22/2023. I have reviewed the chart and agree that the record accurately reflects my personal performance of the history, physical exam, assessment, and plan. I have also personally directed, reviewed, and agreed with the discharge instructions.

## 2023-08-22 NOTE — PHYSICAL EXAM
[Hearing Loss Right Only] : normal [Hearing Loss Left Only] : normal [Hearing Nguyen Test (Tuning Fork On Forehead)] : no lateralization of tone [de-identified] : deviated septum right inflamed turbinates  [Normal] : mucosa is normal [Midline] : trachea located in midline position

## 2023-08-23 RX ORDER — AZELASTINE HYDROCHLORIDE 137 UG/1
137 SPRAY, METERED NASAL TWICE DAILY
Qty: 3 | Refills: 3 | Status: ACTIVE | COMMUNITY
Start: 2023-08-23 | End: 1900-01-01

## 2023-10-06 ENCOUNTER — APPOINTMENT (OUTPATIENT)
Dept: OTOLARYNGOLOGY | Facility: CLINIC | Age: 78
End: 2023-10-06
Payer: MEDICARE

## 2023-10-06 VITALS
HEIGHT: 66 IN | HEART RATE: 80 BPM | SYSTOLIC BLOOD PRESSURE: 139 MMHG | WEIGHT: 152 LBS | BODY MASS INDEX: 24.43 KG/M2 | DIASTOLIC BLOOD PRESSURE: 77 MMHG

## 2023-10-06 DIAGNOSIS — H91.21 SUDDEN IDIOPATHIC HEARING LOSS, RIGHT EAR: ICD-10-CM

## 2023-10-06 PROCEDURE — 99213 OFFICE O/P EST LOW 20 MIN: CPT

## 2023-10-06 RX ORDER — FLUTICASONE PROPIONATE 50 UG/1
50 SPRAY, METERED NASAL
Qty: 3 | Refills: 3 | Status: ACTIVE | COMMUNITY
Start: 2023-08-23 | End: 1900-01-01

## 2023-10-12 ENCOUNTER — RESULT REVIEW (OUTPATIENT)
Age: 78
End: 2023-10-12

## 2023-10-12 ENCOUNTER — APPOINTMENT (OUTPATIENT)
Dept: ORTHOPEDIC SURGERY | Facility: CLINIC | Age: 78
End: 2023-10-12
Payer: MEDICARE

## 2023-10-12 VITALS — HEIGHT: 66 IN | WEIGHT: 152 LBS | BODY MASS INDEX: 24.43 KG/M2

## 2023-10-12 DIAGNOSIS — M47.26 OTHER SPONDYLOSIS WITH RADICULOPATHY, LUMBAR REGION: ICD-10-CM

## 2023-10-12 DIAGNOSIS — M16.12 UNILATERAL PRIMARY OSTEOARTHRITIS, LEFT HIP: ICD-10-CM

## 2023-10-12 DIAGNOSIS — Z78.9 OTHER SPECIFIED HEALTH STATUS: ICD-10-CM

## 2023-10-12 DIAGNOSIS — E04.2 NONTOXIC MULTINODULAR GOITER: ICD-10-CM

## 2023-10-12 PROCEDURE — 99203 OFFICE O/P NEW LOW 30 MIN: CPT

## 2023-10-12 RX ORDER — PREDNISONE 10 MG/1
10 TABLET ORAL 3 TIMES DAILY
Qty: 39 | Refills: 0 | Status: DISCONTINUED | COMMUNITY
Start: 2023-07-11 | End: 2023-10-12

## 2023-10-26 ENCOUNTER — APPOINTMENT (OUTPATIENT)
Dept: ORTHOPEDIC SURGERY | Facility: CLINIC | Age: 78
End: 2023-10-26
Payer: MEDICARE

## 2023-10-26 PROCEDURE — 20610 DRAIN/INJ JOINT/BURSA W/O US: CPT | Mod: RT

## 2023-10-26 PROCEDURE — 99213 OFFICE O/P EST LOW 20 MIN: CPT | Mod: 25

## 2023-11-09 ENCOUNTER — RX RENEWAL (OUTPATIENT)
Age: 78
End: 2023-11-09

## 2023-12-14 ENCOUNTER — APPOINTMENT (OUTPATIENT)
Dept: ORTHOPEDIC SURGERY | Facility: CLINIC | Age: 78
End: 2023-12-14
Payer: MEDICARE

## 2023-12-14 DIAGNOSIS — M70.62 TROCHANTERIC BURSITIS, RIGHT HIP: ICD-10-CM

## 2023-12-14 DIAGNOSIS — M70.61 TROCHANTERIC BURSITIS, RIGHT HIP: ICD-10-CM

## 2023-12-14 PROCEDURE — 20610 DRAIN/INJ JOINT/BURSA W/O US: CPT | Mod: RT

## 2023-12-14 PROCEDURE — 99213 OFFICE O/P EST LOW 20 MIN: CPT | Mod: 25

## 2023-12-14 NOTE — PROCEDURE
[Large Joint Injection] : Large joint injection [Right] : of the right [Greater Trochanteric Bursa] : greater trochanteric bursa [Pain] : pain [Inflammation] : inflammation [Betadine] : betadine [Sterile technique used] : sterile technique used [___ cc    6mg] :  Betamethasone (Celestone) ~Vcc of 6mg [___ cc    1%] : Lidocaine ~Vcc of 1%  [] : Patient tolerated procedure well [Call if redness, pain or fever occur] : call if redness, pain or fever occur [Apply ice for 15min out of every hour for the next 12-24 hours as tolerated] : apply ice for 15 minutes out of every hour for the next 12-24 hours as tolerated [Previous OTC use and PT nontherapeutic] : patient has tried OTC's including aspirin, Ibuprofen, Aleve, etc or prescription NSAIDS, and/or exercises at home and/or physical therapy without satisfactory response [Patient had decreased mobility in the joint] : patient had decreased mobility in the joint [Risks, benefits, alternatives discussed / Verbal consent obtained] : the risks benefits, and alternatives have been discussed, and verbal consent was obtained

## 2023-12-14 NOTE — PHYSICAL EXAM
[de-identified] : Constitutional: The patient appears well developed, well nourished. Examination of patients ability to communicate functionally was normal.       Neurologic: Coordination is normal. Alert and oriented to time, place and person. No evidence of mood disorder, calm affect.       RIGHT      HIP/THIGH: Inspection of the hip/thigh is as follows: Inspection shows no swelling, no ecchymosis, no erythema, no rashes, no masses and no enlarged lymph nodes.       Palpation of the hip/thigh is as follows: greater trochanter tenderness. no groin tenderness.       Range of motion of the hip is as follows in degrees:        Flexion: 100    Abduction:  25 with pain     External rotation:  40    Internal rotation:  10  pain      Strength testing of the hip/thigh is as follows:       Hip flexion strength:  5/5,    Hip extension strength:  5/5    Hip abduction strength:   5/5     Hip adduction strength:   5/5.       Special tests of the hip/thigh is as follows: pain in bursa with internal rotation and pain bursa with external rotation.       Neurological testing of the hip/thigh is as follows: motor exam 5/5 throughout, light touch intact throughout and no focal motor defecits.       Gait and function is as follows: non-antalgic gait.   Constitutional: The patient appears well developed, well nourished. Examination of patients ability to communicate functionally was normal.       Neurologic: Coordination is normal. Alert and oriented to time, place and person. No evidence of mood disorder, calm affect.          LEFT   HIP/THIGH: Inspection of the hip/thigh is as follows: Inspection shows no swelling, no ecchymosis, no erythema, no rashes, no masses and no enlarged lymph nodes.       Palpation of the hip/thigh is as follows: greater trochanter tenderness. no groin tenderness.       Range of motion of the hip is as follows in degrees:        Flexion: 100    Abduction:  30    External rotation:  40    Internal rotation:  25        Strength testing of the hip/thigh is as follows:       Hip flexion strength:  5/5,    Hip extension strength:  5/5    Hip abduction strength:   5/5     Hip adduction strength:   5/5.       Special tests of the hip/thigh is as follows: pain in bursa with internal rotation and pain bursa with external rotation.       Neurological testing of the hip/thigh is as follows: motor exam 5/5 throughout, light touch intact throughout and no focal motor defecits.       Gait and function is as follows: non-antalgic gait.

## 2023-12-14 NOTE — HISTORY OF PRESENT ILLNESS
[de-identified] : following up for the right hip. Patient had CSI for the right greater troch 1 mos. ago. Patient states the pain is much improved since injection in fact her low back pain has improved as well. Her left hip is improving

## 2023-12-14 NOTE — DISCUSSION/SUMMARY
[de-identified] : Extensive discussion of the options was had with the patient. This discussion included both surgical and nonsurgical options. Options including but not limited to cortisone injection, viscosupplementation, physical therapy, oral anti-inflammatories, MRI, arthroplasty and arthroscopy were discussed with the patient. We also discussed the option of observation, allowing the patient to continue rest, ice, NSAIDs and following up if the condition does not improve. Time was taken to go over any questions the patient had in regards to any of the treatment plans described. Plan is for CSI for the left hip, f/u in 2 mos.

## 2023-12-15 ENCOUNTER — TRANSCRIPTION ENCOUNTER (OUTPATIENT)
Age: 78
End: 2023-12-15

## 2024-01-19 ENCOUNTER — APPOINTMENT (OUTPATIENT)
Dept: OTOLARYNGOLOGY | Facility: CLINIC | Age: 79
End: 2024-01-19

## 2024-02-08 ENCOUNTER — APPOINTMENT (OUTPATIENT)
Dept: ORTHOPEDIC SURGERY | Facility: CLINIC | Age: 79
End: 2024-02-08
Payer: MEDICARE

## 2024-02-08 PROCEDURE — 99215 OFFICE O/P EST HI 40 MIN: CPT

## 2024-02-08 NOTE — PHYSICAL EXAM
[de-identified] : Constitutional: The patient appears well developed, well nourished. Examination of patients ability to communicate functionally was normal.       Neurologic: Coordination is normal. Alert and oriented to time, place and person. No evidence of mood disorder, calm affect.       RIGHT      HIP/THIGH: Inspection of the hip/thigh is as follows: Inspection shows no swelling, no ecchymosis, no erythema, no rashes, no masses and no enlarged lymph nodes.       Palpation of the hip/thigh is as follows: greater trochanter tenderness. no groin tenderness.       Range of motion of the hip is as follows in degrees:        Flexion: 100    Abduction:  25 with pain     External rotation:  40    Internal rotation:  10  pain      Strength testing of the hip/thigh is as follows:       Hip flexion strength:  5/5,    Hip extension strength:  5/5    Hip abduction strength:   5/5     Hip adduction strength:   5/5.       Special tests of the hip/thigh is as follows: pain in bursa with internal rotation and pain bursa with external rotation.       Neurological testing of the hip/thigh is as follows: motor exam 5/5 throughout, light touch intact throughout and no focal motor defecits.       Gait and function is as follows: non-antalgic gait.   Constitutional: The patient appears well developed, well nourished. Examination of patients ability to communicate functionally was normal.       Neurologic: Coordination is normal. Alert and oriented to time, place and person. No evidence of mood disorder, calm affect.          LEFT   HIP/THIGH: Inspection of the hip/thigh is as follows: Inspection shows no swelling, no ecchymosis, no erythema, no rashes, no masses and no enlarged lymph nodes.       Palpation of the hip/thigh is as follows: greater trochanter tenderness. no groin tenderness.       Range of motion of the hip is as follows in degrees:        Flexion: 100    Abduction:  30    External rotation:  40    Internal rotation:  25        Strength testing of the hip/thigh is as follows:       Hip flexion strength:  5/5,    Hip extension strength:  5/5    Hip abduction strength:   5/5     Hip adduction strength:   5/5.       Special tests of the hip/thigh is as follows: pain in bursa with internal rotation and pain bursa with external rotation.       Neurological testing of the hip/thigh is as follows: motor exam 5/5 throughout, light touch intact throughout and no focal motor defecits.       Gait and function is as follows: non-antalgic gait.

## 2024-02-08 NOTE — DISCUSSION/SUMMARY
[Surgical risks reviewed] : Surgical risks reviewed [de-identified] : Options were discussed. She feels she is about 75% of normal in the greater troch but still reports significant pain in the right groin that hinders her from walking and doing ADLs around the house. The Risks, benefits, alternatives and expectations of the proposed procedure, as well as non-operative management, were discussed at length with the patient, as well as the procedure itself and the expected recovery period. The possible need for post operative Physical Therapy was also discussed. The patient was allowed as much time as necessary to ask all appropriate questions and they were answered to the patient's satisfaction. We also discussed trying an IA injection into the right hip joint. Patient understands if she had IA injection, she will have to wait 4 months to proceed with the total hip. She understands the potential risk of leg length inequality.   A discussion was completed with the patient regarding the type of implant that is planned, including what the implant is made of.  The possibility of allergy was discussed.  The different methods of implant fixation, as well as the expected longevity of the implant was also discussed.  The bone model was used, as well as the Total Joint pamphlet.  There was amble time to address all of the patient's questions and concerns. We discussed posterior VS anterior. Patient understands the risk of possible injury to the LCFN and resultant deficit. PLan is for right anterior hip.   The following information has been documented by Daysi Ojeda acting as a scribe. Dr. Rossi Attestation The documentation recorded by the scribe, in my presence, accurately reflects the service I, Dr. Rossi, personally performed, and the decisions made by me with my edits as appropriate.

## 2024-02-08 NOTE — HISTORY OF PRESENT ILLNESS
[de-identified] : following up for the right hip. Patient had CSI 1 mos. ago and states she has been feeling much better. She states she has been tryin to go for walks again but it begins to bother her again. Her pain at its worst is a 9/10 and 2/10 while resting.

## 2024-02-12 ENCOUNTER — APPOINTMENT (OUTPATIENT)
Dept: OTOLARYNGOLOGY | Facility: CLINIC | Age: 79
End: 2024-02-12
Payer: MEDICARE

## 2024-02-12 VITALS
SYSTOLIC BLOOD PRESSURE: 134 MMHG | HEART RATE: 82 BPM | WEIGHT: 156 LBS | DIASTOLIC BLOOD PRESSURE: 79 MMHG | HEIGHT: 66 IN | BODY MASS INDEX: 25.07 KG/M2

## 2024-02-12 DIAGNOSIS — R09.81 NASAL CONGESTION: ICD-10-CM

## 2024-02-12 DIAGNOSIS — J34.2 DEVIATED NASAL SEPTUM: ICD-10-CM

## 2024-02-12 DIAGNOSIS — H91.8X3 OTHER SPECIFIED HEARING LOSS, BILATERAL: ICD-10-CM

## 2024-02-12 DIAGNOSIS — J31.0 CHRONIC RHINITIS: ICD-10-CM

## 2024-02-12 DIAGNOSIS — H69.91 UNSPECIFIED EUSTACHIAN TUBE DISORDER, RIGHT EAR: ICD-10-CM

## 2024-02-12 DIAGNOSIS — J34.89 NASAL CONGESTION: ICD-10-CM

## 2024-02-12 PROCEDURE — 99213 OFFICE O/P EST LOW 20 MIN: CPT

## 2024-02-12 RX ORDER — AZELASTINE HYDROCHLORIDE AND FLUTICASONE PROPIONATE 137; 50 UG/1; UG/1
137-50 SPRAY, METERED NASAL
Qty: 1 | Refills: 5 | Status: DISCONTINUED | COMMUNITY
Start: 2023-08-22 | End: 2024-02-12

## 2024-02-12 NOTE — HISTORY OF PRESENT ILLNESS
[de-identified] : Pt with h/o PND, ear popping, and ear fullness presents today for a 3 month follow up. Pt states that she is feeling good. Pt states that she uses Flonase and Azelastine as needed when her ears feel clogged.

## 2024-02-12 NOTE — ASSESSMENT
[FreeTextEntry1] :  Reviewed and reconciled medications, allergies, PMHx, PSHx, SocHx, FMHx.  Pt with h/o PND, ear popping, and ear fullness presents today for a 3 month follow up. Pt states that she is feeling good. Pt states that she uses Flonase and Azelastine as needed when her ears feel clogged.   Physical exam: -right ear: minimal cerumen removed via curettage. narrow ear canal -left ear: minimal cerumen removed via curettage -tuning forks lateralizes to the left ear -severely deviate septum right -mildly inflamed turbinates  Plan: -Continue Flonase and Azelastine as needed -FU in 4 months

## 2024-02-12 NOTE — ADDENDUM
[FreeTextEntry1] :  Documented by Dwight Mix acting as scribe for Dr. Mccormick on 02/12/2024. All Medical record entries made by the Scribe were at my, Dr. Mccormick, direction and personally dictated by me on 02/12/2024 . I have reviewed the chart and agree that the record accurately reflects my personal performance of the history, physical exam, assessment and plan. I have also personally directed, reviewed, and agreed with the discharge instructions.

## 2024-02-12 NOTE — PHYSICAL EXAM
[Nguyen Test Lateralizes To Left] : tone lateralization to the left [] : septum deviated to the right [Normal] : mucosa is normal [Midline] : trachea located in midline position [Hearing Loss Right Only] : normal [Hearing Loss Left Only] : normal [FreeTextEntry8] : minimal cerumen removed via curettage. narrow ear canal [FreeTextEntry9] : minimal cerumen removed via curettage [de-identified] :  mildly inflamed turbinates [FreeTextEntry2] :  sinuses nontender to percussion.  sensations intact

## 2024-05-08 ENCOUNTER — RESULT REVIEW (OUTPATIENT)
Age: 79
End: 2024-05-08

## 2024-05-08 ENCOUNTER — OUTPATIENT (OUTPATIENT)
Dept: OUTPATIENT SERVICES | Facility: HOSPITAL | Age: 79
LOS: 1 days | End: 2024-05-08
Payer: MEDICARE

## 2024-05-08 VITALS
HEART RATE: 78 BPM | DIASTOLIC BLOOD PRESSURE: 70 MMHG | OXYGEN SATURATION: 99 % | HEIGHT: 64 IN | SYSTOLIC BLOOD PRESSURE: 130 MMHG | WEIGHT: 164.91 LBS | RESPIRATION RATE: 14 BRPM | TEMPERATURE: 98 F

## 2024-05-08 DIAGNOSIS — M16.11 UNILATERAL PRIMARY OSTEOARTHRITIS, RIGHT HIP: ICD-10-CM

## 2024-05-08 DIAGNOSIS — Z98.890 OTHER SPECIFIED POSTPROCEDURAL STATES: Chronic | ICD-10-CM

## 2024-05-08 DIAGNOSIS — Z90.711 ACQUIRED ABSENCE OF UTERUS WITH REMAINING CERVICAL STUMP: Chronic | ICD-10-CM

## 2024-05-08 DIAGNOSIS — Z01.818 ENCOUNTER FOR OTHER PREPROCEDURAL EXAMINATION: ICD-10-CM

## 2024-05-08 LAB
A1C WITH ESTIMATED AVERAGE GLUCOSE RESULT: 5.7 % — HIGH (ref 4–5.6)
ALBUMIN SERPL ELPH-MCNC: 4 G/DL — SIGNIFICANT CHANGE UP (ref 3.3–5)
ALP SERPL-CCNC: 49 U/L — SIGNIFICANT CHANGE UP (ref 30–120)
ALT FLD-CCNC: 22 U/L — SIGNIFICANT CHANGE UP (ref 10–60)
ANION GAP SERPL CALC-SCNC: 2 MMOL/L — LOW (ref 5–17)
APTT BLD: 35 SEC — SIGNIFICANT CHANGE UP (ref 24.5–35.6)
AST SERPL-CCNC: 23 U/L — SIGNIFICANT CHANGE UP (ref 10–40)
BILIRUB SERPL-MCNC: 0.3 MG/DL — SIGNIFICANT CHANGE UP (ref 0.2–1.2)
BLD GP AB SCN SERPL QL: SIGNIFICANT CHANGE UP
BUN SERPL-MCNC: 18 MG/DL — SIGNIFICANT CHANGE UP (ref 7–23)
CALCIUM SERPL-MCNC: 9.4 MG/DL — SIGNIFICANT CHANGE UP (ref 8.4–10.5)
CHLORIDE SERPL-SCNC: 104 MMOL/L — SIGNIFICANT CHANGE UP (ref 96–108)
CO2 SERPL-SCNC: 34 MMOL/L — HIGH (ref 22–31)
CREAT SERPL-MCNC: 0.76 MG/DL — SIGNIFICANT CHANGE UP (ref 0.5–1.3)
EGFR: 81 ML/MIN/1.73M2 — SIGNIFICANT CHANGE UP
ESTIMATED AVERAGE GLUCOSE: 117 MG/DL — HIGH (ref 68–114)
GLUCOSE SERPL-MCNC: 95 MG/DL — SIGNIFICANT CHANGE UP (ref 70–99)
HCT VFR BLD CALC: 38 % — SIGNIFICANT CHANGE UP (ref 34.5–45)
HGB BLD-MCNC: 12.4 G/DL — SIGNIFICANT CHANGE UP (ref 11.5–15.5)
INR BLD: 1.05 RATIO — SIGNIFICANT CHANGE UP (ref 0.85–1.18)
MCHC RBC-ENTMCNC: 30 PG — SIGNIFICANT CHANGE UP (ref 27–34)
MCHC RBC-ENTMCNC: 32.6 GM/DL — SIGNIFICANT CHANGE UP (ref 32–36)
MCV RBC AUTO: 91.8 FL — SIGNIFICANT CHANGE UP (ref 80–100)
MRSA PCR RESULT.: SIGNIFICANT CHANGE UP
NRBC # BLD: 0 /100 WBCS — SIGNIFICANT CHANGE UP (ref 0–0)
PLATELET # BLD AUTO: 184 K/UL — SIGNIFICANT CHANGE UP (ref 150–400)
POTASSIUM SERPL-MCNC: 4.4 MMOL/L — SIGNIFICANT CHANGE UP (ref 3.5–5.3)
POTASSIUM SERPL-SCNC: 4.4 MMOL/L — SIGNIFICANT CHANGE UP (ref 3.5–5.3)
PROT SERPL-MCNC: 7.6 G/DL — SIGNIFICANT CHANGE UP (ref 6–8.3)
PROTHROM AB SERPL-ACNC: 11.7 SEC — SIGNIFICANT CHANGE UP (ref 9.5–13)
RBC # BLD: 4.14 M/UL — SIGNIFICANT CHANGE UP (ref 3.8–5.2)
RBC # FLD: 11.7 % — SIGNIFICANT CHANGE UP (ref 10.3–14.5)
S AUREUS DNA NOSE QL NAA+PROBE: DETECTED
SODIUM SERPL-SCNC: 140 MMOL/L — SIGNIFICANT CHANGE UP (ref 135–145)
WBC # BLD: 5.78 K/UL — SIGNIFICANT CHANGE UP (ref 3.8–10.5)
WBC # FLD AUTO: 5.78 K/UL — SIGNIFICANT CHANGE UP (ref 3.8–10.5)

## 2024-05-08 PROCEDURE — G0463: CPT

## 2024-05-08 PROCEDURE — 93010 ELECTROCARDIOGRAM REPORT: CPT

## 2024-05-08 PROCEDURE — 80053 COMPREHEN METABOLIC PANEL: CPT

## 2024-05-08 PROCEDURE — 85027 COMPLETE CBC AUTOMATED: CPT

## 2024-05-08 PROCEDURE — 85730 THROMBOPLASTIN TIME PARTIAL: CPT

## 2024-05-08 PROCEDURE — 73502 X-RAY EXAM HIP UNI 2-3 VIEWS: CPT

## 2024-05-08 PROCEDURE — 83036 HEMOGLOBIN GLYCOSYLATED A1C: CPT

## 2024-05-08 PROCEDURE — 93005 ELECTROCARDIOGRAM TRACING: CPT

## 2024-05-08 PROCEDURE — 86901 BLOOD TYPING SEROLOGIC RH(D): CPT

## 2024-05-08 PROCEDURE — 36415 COLL VENOUS BLD VENIPUNCTURE: CPT

## 2024-05-08 PROCEDURE — 86850 RBC ANTIBODY SCREEN: CPT

## 2024-05-08 PROCEDURE — 85610 PROTHROMBIN TIME: CPT

## 2024-05-08 PROCEDURE — 87640 STAPH A DNA AMP PROBE: CPT

## 2024-05-08 PROCEDURE — 73502 X-RAY EXAM HIP UNI 2-3 VIEWS: CPT | Mod: 26,RT

## 2024-05-08 PROCEDURE — 86900 BLOOD TYPING SEROLOGIC ABO: CPT

## 2024-05-08 PROCEDURE — 87641 MR-STAPH DNA AMP PROBE: CPT

## 2024-05-08 NOTE — H&P PST ADULT - PROBLEM SELECTOR PLAN 1
scheduled for right hip replacement on 5/23/24  will obtain medical clearance   pre op instructions on wash and medication scheduled for right hip replacement on 5/23/24  will obtain medical clearance and cardiac clearance   abnormal EKG ; referred to cardiologist   pre op instructions on wash and medication  Right Hip Xray ordered  on 5/8/24

## 2024-05-08 NOTE — H&P PST ADULT - NSICDXFAMILYHX_GEN_ALL_CORE_FT
FAMILY HISTORY:  Father  Still living? No  Family history of heart attack, Age at diagnosis: Age Unknown    Mother  Still living? No  FHx: stroke, Age at diagnosis: Age Unknown

## 2024-05-08 NOTE — H&P PST ADULT - NSICDXPASTMEDICALHX_GEN_ALL_CORE_FT
PAST MEDICAL HISTORY:  HLD (hyperlipidemia)     HTN (hypertension)     Mitral valve prolapse     Osteoarthritis of right hip     Venous insufficiency

## 2024-05-08 NOTE — H&P PST ADULT - HISTORY OF PRESENT ILLNESS
78 y female with PMHx of HTN, HLD Right hip OA presents with 3 year history of worsening right hip pain /groin pain , Reports intermittent pain and increased pain when walking , climbing stairs , wether changes , Received cortisone injection 3 moths ago with some relief . scheduled for right hip replacement on 5/23/24

## 2024-05-08 NOTE — H&P PST ADULT - MUSCULOSKELETAL
details… right  hip/decreased ROM due to pain/decreased strength right  hip pain/decreased ROM due to pain/extremities exam/decreased strength

## 2024-05-09 RX ORDER — MUPIROCIN 20 MG/G
1 OINTMENT TOPICAL
Qty: 1 | Refills: 0
Start: 2024-05-09 | End: 2024-05-13

## 2024-05-22 RX ORDER — CEFAZOLIN SODIUM 1 G
2000 VIAL (EA) INJECTION EVERY 8 HOURS
Refills: 0 | Status: COMPLETED | OUTPATIENT
Start: 2024-05-23 | End: 2024-05-23

## 2024-05-22 RX ORDER — MAGNESIUM HYDROXIDE 400 MG/1
30 TABLET, CHEWABLE ORAL DAILY
Refills: 0 | Status: DISCONTINUED | OUTPATIENT
Start: 2024-05-23 | End: 2024-05-25

## 2024-05-22 RX ORDER — OXYCODONE HYDROCHLORIDE 5 MG/1
5 TABLET ORAL
Refills: 0 | Status: DISCONTINUED | OUTPATIENT
Start: 2024-05-23 | End: 2024-05-25

## 2024-05-22 RX ORDER — SODIUM CHLORIDE 9 MG/ML
1000 INJECTION, SOLUTION INTRAVENOUS
Refills: 0 | Status: DISCONTINUED | OUTPATIENT
Start: 2024-05-23 | End: 2024-05-25

## 2024-05-22 RX ORDER — POLYETHYLENE GLYCOL 3350 17 G/17G
17 POWDER, FOR SOLUTION ORAL AT BEDTIME
Refills: 0 | Status: DISCONTINUED | OUTPATIENT
Start: 2024-05-23 | End: 2024-05-25

## 2024-05-22 RX ORDER — HYDROMORPHONE HYDROCHLORIDE 2 MG/ML
0.5 INJECTION INTRAMUSCULAR; INTRAVENOUS; SUBCUTANEOUS
Refills: 0 | Status: DISCONTINUED | OUTPATIENT
Start: 2024-05-23 | End: 2024-05-25

## 2024-05-22 RX ORDER — OXYCODONE HYDROCHLORIDE 5 MG/1
10 TABLET ORAL
Refills: 0 | Status: DISCONTINUED | OUTPATIENT
Start: 2024-05-23 | End: 2024-05-25

## 2024-05-22 RX ORDER — SENNA PLUS 8.6 MG/1
2 TABLET ORAL AT BEDTIME
Refills: 0 | Status: DISCONTINUED | OUTPATIENT
Start: 2024-05-23 | End: 2024-05-25

## 2024-05-22 RX ORDER — PANTOPRAZOLE SODIUM 20 MG/1
40 TABLET, DELAYED RELEASE ORAL
Refills: 0 | Status: DISCONTINUED | OUTPATIENT
Start: 2024-05-23 | End: 2024-05-25

## 2024-05-22 RX ORDER — DEXAMETHASONE 0.5 MG/5ML
8 ELIXIR ORAL ONCE
Refills: 0 | Status: COMPLETED | OUTPATIENT
Start: 2024-05-24 | End: 2024-05-24

## 2024-05-22 RX ORDER — ONDANSETRON 8 MG/1
4 TABLET, FILM COATED ORAL EVERY 6 HOURS
Refills: 0 | Status: DISCONTINUED | OUTPATIENT
Start: 2024-05-23 | End: 2024-05-25

## 2024-05-22 RX ORDER — ASPIRIN/CALCIUM CARB/MAGNESIUM 324 MG
325 TABLET ORAL EVERY 12 HOURS
Refills: 0 | Status: DISCONTINUED | OUTPATIENT
Start: 2024-05-24 | End: 2024-05-25

## 2024-05-22 RX ORDER — CELECOXIB 200 MG/1
200 CAPSULE ORAL EVERY 12 HOURS
Refills: 0 | Status: DISCONTINUED | OUTPATIENT
Start: 2024-05-23 | End: 2024-05-25

## 2024-05-22 RX ORDER — ACETAMINOPHEN 500 MG
1000 TABLET ORAL EVERY 8 HOURS
Refills: 0 | Status: DISCONTINUED | OUTPATIENT
Start: 2024-05-23 | End: 2024-05-25

## 2024-05-22 RX ORDER — SODIUM CHLORIDE 9 MG/ML
500 INJECTION INTRAMUSCULAR; INTRAVENOUS; SUBCUTANEOUS ONCE
Refills: 0 | Status: COMPLETED | OUTPATIENT
Start: 2024-05-23 | End: 2024-05-23

## 2024-05-23 ENCOUNTER — INPATIENT (INPATIENT)
Facility: HOSPITAL | Age: 79
LOS: 1 days | Discharge: NURSING, REHAB OR LONG-TERM CARE | DRG: 554 | End: 2024-05-25
Attending: ORTHOPAEDIC SURGERY | Admitting: ORTHOPAEDIC SURGERY
Payer: MEDICARE

## 2024-05-23 ENCOUNTER — APPOINTMENT (OUTPATIENT)
Dept: ORTHOPEDIC SURGERY | Facility: HOSPITAL | Age: 79
End: 2024-05-23
Payer: MEDICARE

## 2024-05-23 VITALS
WEIGHT: 155.43 LBS | SYSTOLIC BLOOD PRESSURE: 132 MMHG | OXYGEN SATURATION: 97 % | HEART RATE: 81 BPM | TEMPERATURE: 98 F | HEIGHT: 66 IN | RESPIRATION RATE: 16 BRPM | DIASTOLIC BLOOD PRESSURE: 79 MMHG

## 2024-05-23 DIAGNOSIS — M16.11 UNILATERAL PRIMARY OSTEOARTHRITIS, RIGHT HIP: ICD-10-CM

## 2024-05-23 DIAGNOSIS — Z90.711 ACQUIRED ABSENCE OF UTERUS WITH REMAINING CERVICAL STUMP: Chronic | ICD-10-CM

## 2024-05-23 DIAGNOSIS — Z98.890 OTHER SPECIFIED POSTPROCEDURAL STATES: Chronic | ICD-10-CM

## 2024-05-23 LAB — ABO RH CONFIRMATION: SIGNIFICANT CHANGE UP

## 2024-05-23 PROCEDURE — 27130 TOTAL HIP ARTHROPLASTY: CPT | Mod: AS,RT

## 2024-05-23 PROCEDURE — 27130 TOTAL HIP ARTHROPLASTY: CPT | Mod: RT

## 2024-05-23 PROCEDURE — 76000 FLUOROSCOPY <1 HR PHYS/QHP: CPT | Mod: 26

## 2024-05-23 DEVICE — IMPLANTABLE DEVICE: Type: IMPLANTABLE DEVICE | Site: RIGHT | Status: FUNCTIONAL

## 2024-05-23 DEVICE — SHELL ACET G7 OSSEOTI HEMISPHR 3H SZ D 50MM: Type: IMPLANTABLE DEVICE | Site: RIGHT | Status: FUNCTIONAL

## 2024-05-23 DEVICE — BONE WAX 2.5GM: Type: IMPLANTABLE DEVICE | Site: RIGHT | Status: FUNCTIONAL

## 2024-05-23 DEVICE — HEAD CERAMIC BIOLOX 36MM DELTA OPTION: Type: IMPLANTABLE DEVICE | Site: RIGHT | Status: FUNCTIONAL

## 2024-05-23 DEVICE — LINER ACET VIT E G7 NEUT SZ D 36MM: Type: IMPLANTABLE DEVICE | Site: RIGHT | Status: FUNCTIONAL

## 2024-05-23 RX ORDER — EZETIMIBE 10 MG/1
1 TABLET ORAL
Refills: 0 | DISCHARGE

## 2024-05-23 RX ORDER — TELMISARTAN 20 MG/1
1 TABLET ORAL
Refills: 0 | DISCHARGE

## 2024-05-23 RX ORDER — LOSARTAN POTASSIUM 100 MG/1
25 TABLET, FILM COATED ORAL DAILY
Refills: 0 | Status: DISCONTINUED | OUTPATIENT
Start: 2024-05-25 | End: 2024-05-25

## 2024-05-23 RX ORDER — TRANEXAMIC ACID 100 MG/ML
1000 INJECTION, SOLUTION INTRAVENOUS ONCE
Refills: 0 | Status: COMPLETED | OUTPATIENT
Start: 2024-05-23 | End: 2024-05-23

## 2024-05-23 RX ORDER — SODIUM CHLORIDE 9 MG/ML
1000 INJECTION, SOLUTION INTRAVENOUS
Refills: 0 | Status: DISCONTINUED | OUTPATIENT
Start: 2024-05-23 | End: 2024-05-23

## 2024-05-23 RX ORDER — HYDROMORPHONE HYDROCHLORIDE 2 MG/ML
0.2 INJECTION INTRAMUSCULAR; INTRAVENOUS; SUBCUTANEOUS
Refills: 0 | Status: DISCONTINUED | OUTPATIENT
Start: 2024-05-23 | End: 2024-05-23

## 2024-05-23 RX ORDER — ROSUVASTATIN CALCIUM 5 MG/1
1 TABLET ORAL
Refills: 0 | DISCHARGE

## 2024-05-23 RX ORDER — CHLORHEXIDINE GLUCONATE 213 G/1000ML
1 SOLUTION TOPICAL ONCE
Refills: 0 | Status: COMPLETED | OUTPATIENT
Start: 2024-05-23 | End: 2024-05-23

## 2024-05-23 RX ORDER — CEFAZOLIN SODIUM 1 G
2000 VIAL (EA) INJECTION ONCE
Refills: 0 | Status: COMPLETED | OUTPATIENT
Start: 2024-05-23 | End: 2024-05-23

## 2024-05-23 RX ORDER — APREPITANT 80 MG/1
40 CAPSULE ORAL ONCE
Refills: 0 | Status: COMPLETED | OUTPATIENT
Start: 2024-05-23 | End: 2024-05-23

## 2024-05-23 RX ORDER — ATORVASTATIN CALCIUM 80 MG/1
40 TABLET, FILM COATED ORAL AT BEDTIME
Refills: 0 | Status: DISCONTINUED | OUTPATIENT
Start: 2024-05-23 | End: 2024-05-25

## 2024-05-23 RX ORDER — HYDROMORPHONE HYDROCHLORIDE 2 MG/ML
0.5 INJECTION INTRAMUSCULAR; INTRAVENOUS; SUBCUTANEOUS
Refills: 0 | Status: DISCONTINUED | OUTPATIENT
Start: 2024-05-23 | End: 2024-05-23

## 2024-05-23 RX ORDER — SODIUM CHLORIDE 9 MG/ML
500 INJECTION INTRAMUSCULAR; INTRAVENOUS; SUBCUTANEOUS ONCE
Refills: 0 | Status: COMPLETED | OUTPATIENT
Start: 2024-05-23 | End: 2024-05-23

## 2024-05-23 RX ORDER — ACETAMINOPHEN 500 MG
1000 TABLET ORAL ONCE
Refills: 0 | Status: COMPLETED | OUTPATIENT
Start: 2024-05-23 | End: 2024-05-23

## 2024-05-23 RX ADMIN — Medication 100 MILLIGRAM(S): at 23:27

## 2024-05-23 RX ADMIN — Medication 100 MILLIGRAM(S): at 15:47

## 2024-05-23 RX ADMIN — ATORVASTATIN CALCIUM 40 MILLIGRAM(S): 80 TABLET, FILM COATED ORAL at 21:17

## 2024-05-23 RX ADMIN — Medication 400 MILLIGRAM(S): at 14:25

## 2024-05-23 RX ADMIN — SODIUM CHLORIDE 500 MILLILITER(S): 9 INJECTION INTRAMUSCULAR; INTRAVENOUS; SUBCUTANEOUS at 16:22

## 2024-05-23 RX ADMIN — SODIUM CHLORIDE 125 MILLILITER(S): 9 INJECTION, SOLUTION INTRAVENOUS at 14:25

## 2024-05-23 RX ADMIN — CELECOXIB 200 MILLIGRAM(S): 200 CAPSULE ORAL at 22:40

## 2024-05-23 RX ADMIN — Medication 1000 MILLIGRAM(S): at 21:18

## 2024-05-23 RX ADMIN — APREPITANT 40 MILLIGRAM(S): 80 CAPSULE ORAL at 06:58

## 2024-05-23 RX ADMIN — SODIUM CHLORIDE 75 MILLILITER(S): 9 INJECTION, SOLUTION INTRAVENOUS at 11:49

## 2024-05-23 RX ADMIN — OXYCODONE HYDROCHLORIDE 5 MILLIGRAM(S): 5 TABLET ORAL at 20:00

## 2024-05-23 RX ADMIN — ONDANSETRON 4 MILLIGRAM(S): 8 TABLET, FILM COATED ORAL at 16:56

## 2024-05-23 RX ADMIN — OXYCODONE HYDROCHLORIDE 5 MILLIGRAM(S): 5 TABLET ORAL at 19:16

## 2024-05-23 RX ADMIN — SODIUM CHLORIDE 500 MILLILITER(S): 9 INJECTION INTRAMUSCULAR; INTRAVENOUS; SUBCUTANEOUS at 11:51

## 2024-05-23 RX ADMIN — CELECOXIB 200 MILLIGRAM(S): 200 CAPSULE ORAL at 21:17

## 2024-05-23 RX ADMIN — Medication 1000 MILLIGRAM(S): at 22:40

## 2024-05-23 RX ADMIN — CHLORHEXIDINE GLUCONATE 1 APPLICATION(S): 213 SOLUTION TOPICAL at 06:58

## 2024-05-23 RX ADMIN — Medication 1000 MILLIGRAM(S): at 15:23

## 2024-05-23 NOTE — BRIEF OPERATIVE NOTE - VENOUS THROMBOEMBOLISM PROPHYLAXIS THERAPY
Findings:  Physical Exam   Skin:   Hands clear       Medical Necessity of Exam Performed:   Distribution of patient concerns    Additional Diagnostic Testing performed during exam: Not performed ,  Not performed    ASSESSMENT:   Diagnosis Orders   1. Irritant contact hand eczema         Plan of Action is as Follows:  Assessment 1. Irritant contact hand eczema  Clear  - Currently not needing mometasone - OK to restart if rash recurs in the winter at twice weekly - call if needing more - I am happy to send in refills with 1 year follow if needing medication - otherwise follow up PRN  - Continue sensitive skin products - letter for school provided          Patient Instructions   - Continue using mometasone twice weekly - continue at this frequency if needing to increase frequency for a prolonger period (more than a week) call to adjust plan  - Continue Aveeno baby  - Continue avoiding hand   - Follow up in the office as needed      Photo surveillance performed: No    Follow-up: PRN    This note was created with the assistance of a speech-recognition program.  Although the intention is to generate a document that actually reflects the content of the visit, no guarantees can be provided that every mistake has been identified and corrected by editing.     Electronically signed by Maribel Xiao MD on 7/30/18 at 9:44 AM
per protocol

## 2024-05-23 NOTE — PHYSICAL THERAPY INITIAL EVALUATION ADULT - NSACTIVITYREC_GEN_A_PT
The patient has a mobility limitation that significantly impairs the ability to participate in one are more mobility related activities of daily living in the home. The patient is able to safely use the rolling walker. The functional mobility deficit can be sufficiently resolved by use of a rolling walker.

## 2024-05-23 NOTE — BRIEF OPERATIVE NOTE - NSICDXBRIEFPROCEDURE_GEN_ALL_CORE_FT
PROCEDURES:  Total arthroplasty, hip, anterior approach 23-May-2024 10:06:31 right Raimundo Mendoza

## 2024-05-23 NOTE — PATIENT PROFILE ADULT - FUNCTIONAL ASSESSMENT - DAILY ACTIVITY SECTION LABEL
INFECTIOUS DISEASE PROGRESS NOTE    ASSESSMENT:    # Soft tissue infection of LLE medial fasciotomy wound s/p bedside debridement of of eschar, necrotic muscle and fascia and evacuation of old hematoma on -now status postdebridement in OR on   # Possible cholecystitis s/p samantha tube placement 12/15-cx NGTD   # CT with postoperative fluid collection not concerning for abscess per discussion with vascular surgery colleagues  #Progressive ischemic changes of medial fasciotomy wound base/tisseu   #Acute nonocclusive thrombus of distal left common iliac artery with extension to left external and external iliac arteries extending to LLE s/p left lower extremity thrombectomy, with 4 compartment fasciotomy on 2022  #Hypoxic respiratory failure,due to volume overload  #Urinary retention  now status post Stevenson catheter placement  #Rchwpotngzru-yvoanxkgkhrvoh-kaiviz cholecystitis/soft tissue infection of medial fasciotomy wound, big component due to reaction to significant anasarca and volume overload, new IV infiltration  #Severely inflamed right upper extremity IV site due to infiltration  # esophagitis noted on CT- non specific finding          PLAN:     - Continue meropenem and daptomycin-plan to complete 3 weeks of IV antibiotics therapy.  End of treatment on 2022  -Place midline        Eugenia Garner MD   [phone]    -------------------------------------------------------------------------------------------------------------------    SUBJECTIVE:    Left lower extremity fasciotomy wounds evaluated PT wound care colleagues.  Lateral fasciotomy site is clean.  No evidence of purulence drainage or necrotic tissue noted on evaluation of medial fasciotomy site.    OBJECTIVE:  Tmax Temp (24hrs), Av.4 °F (36.9 °C), Min:97.9 °F (36.6 °C), Max:99 °F (37.2 °C)     Last Vitals   Vitals:    22 1537   BP: 112/67   Pulse: 96   Resp: 15   Temp: 99 °F (37.2 °C)       Gen: Awake and alert on nasal  cannula  HEENT: Anicteric  CV: Tachycardic no murmur  Pulm: Rales heard in lung bases  Abd: Distended soft sluggish bowel sounds ?  No tenderness  Ext: Pitting edema.  Right lower extremity with occlusive dressing in place  Psych: Awake alert and conversant  Skin: As above.  Pitting edema noted in bilateral lower extremities    ANTIMICROBIALS  Meropenem  daptomycin     LAB:  Recent Labs     12/18/22  0447 12/18/22  1801 12/19/22  0455 12/20/22  0459   WBC 21.8* 21.8* 19.2* 21.1*   HGB 8.0* 8.5* 8.3* 8.0*   HCT 24.3* 25.5* 25.4* 24.5*   * 785* 823* 839*   MCV 86.8 86.7 87.3 87.2       Recent Labs   Lab 12/20/22  0459 12/19/22  0455 12/18/22  0447   SODIUM 132* 134* 134*   POTASSIUM 4.2 4.2 4.2   CHLORIDE 102 102 101   CO2 24 23 28   BUN 9 9 10   CREATININE 0.76 0.84 0.81   GLUCOSE 318* 253* 212*   CALCIUM 7.4* 6.9* 7.1*       Microbiology Results  (Last 10 results in the past 7 days)    Specimen   Gram Smear   Culture Result   Status       12/15/22  1445         No polymorphonuclear cells seen.            No epithelial cells seen.            No organisms seen.                  RADIOLOGY: images reviewed       Note:  Assessment and Plan have been moved to the top of the screen for ease of the viewer such that the plan can be seen without scrolling to the bottom of the note.             .

## 2024-05-23 NOTE — PHYSICAL THERAPY INITIAL EVALUATION ADULT - PERTINENT HX OF CURRENT PROBLEM, REHAB EVAL
Pt is a 79 y/o female with right hip primary OA. Pt is s/p right total hip replacement anterior approach on 5/23/24.

## 2024-05-23 NOTE — PHYSICAL THERAPY INITIAL EVALUATION ADULT - ADDITIONAL COMMENTS
Pt lives with  in a private house, there are 3 stairs +HR to enter and no stairs to negotiate within. Pt has a walk in shower, +grab bars. PTA pt was independent with ADLs and ambulation.

## 2024-05-23 NOTE — DISCHARGE NOTE PROVIDER - NSDCFUADDAPPT_GEN_ALL_CORE_FT
Follow up with your primary care provider within the next 2 weeks to ensure your medications are appropriate and there are no underlying problems after your procedure.  Please follow up w/ Dr Rossi at [place] office.  Follow up with your primary care provider within the next 2 weeks to ensure your medications are appropriate and there are no underlying problems after your procedure.

## 2024-05-23 NOTE — DISCHARGE NOTE PROVIDER - PROVIDER TOKENS
PROVIDER:[TOKEN:[[id number]],FOLLOWUP:[2 weeks]] PROVIDER:[TOKEN:[[id number]],SCHEDULEDAPPT:[06/05/2024],SCHEDULEDAPPTTIME:[11:00 AM],ESTABLISHEDPATIENT:[T]]

## 2024-05-23 NOTE — DISCHARGE NOTE PROVIDER - NSDCFUSCHEDAPPT_GEN_ALL_CORE_FT
[facility]  [address]  Scheduled Appointment: 06/05/2024    Julio Mccormick  [facility]  [address]  Scheduled Appointment: 08/19/2024

## 2024-05-23 NOTE — PATIENT PROFILE ADULT - PACKS PER DAY
Progress Note (short form)





- Note


Progress Note: 





PULMONARY





Remains unresponsive off sedation, vented. Son at bedside. 





 Last Vital Signs











Temp Pulse Resp BP Pulse Ox


 


 99.1 F   96 H  18   138/70   100 


 


 01/14/18 09:00  01/14/18 09:00  01/14/18 12:22  01/14/18 09:00  01/14/18 09:00











Gen:  vented, less tachypneic


Heart:  RRR


Lung:  distant breath sounds, no wheezes


Abd: soft, nontender


Ext: no edema





 CBC, BMP





 01/12/18 20:20 





 01/13/18 07:00 





Active Medications





Acetaminophen (Tylenol Suppository -)  650 mg IN Q6H PRN


   PRN Reason: FEVER


   Last Admin: 01/13/18 21:37 Dose:  650 mg


Levetiracetam 1,500 mg/ Sodium (Chloride)  115 mls @ 460 mls/hr IVPB BID DOROTHY


   Last Admin: 01/14/18 10:33 Dose:  460 mls/hr


Dextrose (D5w -)  1,000 mls @ 42 mls/hr IV Q23H DOROTHY


   Last Admin: 01/14/18 13:55 Dose:  42 mls/hr


Lorazepam (Ativan Injection -)  2 mg IVPUSH Q4H PRN


   PRN Reason: ANXIETY


   Last Admin: 01/13/18 13:10 Dose:  2 mg


Morphine Sulfate (Morphine Injection -)  2 mg IVPUSH Q3H PRN


   PRN Reason: ANXIETY


   Last Admin: 01/12/18 15:38 Dose:  2 mg








A/P


PEA Cardiac Arrest


Acute on Chronic Hypercapneic and Hypoxic Respiratory Failure


Severe COPD - r/o Acute Exacerbation


Paroxysmal Atrial Flutter


CAD s/p CABG 


s/p bioAVR


Lactic Acidosis resolved


Elevated LFTs likely ischemic injury


Severe LV Systolic Dysfunction


+Troponins likely Demand Ischemia


HTN


Hyperlipidemia


DM


Seizures





-  continue supportive care


-  agree with comfort measures, palliation 0.5

## 2024-05-23 NOTE — BRIEF OPERATIVE NOTE - NSICDXBRIEFPREOP_GEN_ALL_CORE_FT
PRE-OP DIAGNOSIS:  Degenerative joint disease (DJD) of hip 23-May-2024 10:08:32 right Raimundo Mendoza

## 2024-05-23 NOTE — PHYSICAL THERAPY INITIAL EVALUATION ADULT - LEVEL OF INDEPENDENCE: GAIT, REHAB EVAL
due to decreased sensation in b/l LE and significant b/l knee buckling upon standing/unable to perform

## 2024-05-23 NOTE — DISCHARGE NOTE PROVIDER - NSDCCPCAREPLAN_GEN_ALL_CORE_FT
PRINCIPAL DISCHARGE DIAGNOSIS  Diagnosis: Primary localized osteoarthritis of right hip  Assessment and Plan of Treatment: You have had an Anterior Total Hip Replacement. Follow instructions given to you by your surgeon.   PT/OT Total Hip Protocol; Ambulation, transfers, stairs, & ADLs  Full weight bearing both legs; Walker/cane use as instructed by PT/OT  Anterior THR precautions for 4 weeks: No straight leg raise; No external rotation of hip when extended-standing or lying flat; No hyperextension of hip when standing (kickback)  • Ice 20 minutes several times daily with at least a 20 minute break in between icing sessions  You have a Mepilex dressing. You may shower. Mepilex dressing is water resistant, not waterproof. Do not aim shower stream at surgical site.  Pat dry after showering.  Remove Mepilex dressing in 1 week.  Keep incision clean. DO NOT APPLY ANYTHING to incision site (salves/ointments/creams).  May shower.  Do not scrub incision site. Pat dry after shower.   See PCP in office approximately 2-3 weeks from discharge for exam/labwork.

## 2024-05-23 NOTE — PATIENT CHOICE NOTE. - NSPTCHOICENOTES_GEN_ALL_CORE
[facility] care agency [phone] will reach out to you within 24-72 hours of your discharge to schedule home care visit/eval appointment with you. Please call agency for any queries regarding home care services.

## 2024-05-23 NOTE — CARE COORDINATION ASSESSMENT. - NSPASTMEDSURGHISTORY_GEN_ALL_CORE_FT
PAST MEDICAL & SURGICAL HISTORY:  HLD (hyperlipidemia)      HTN (hypertension)      S/P partial hysterectomy      Mitral valve prolapse      Venous insufficiency      Osteoarthritis of right hip      S/P right rotator cuff repair

## 2024-05-23 NOTE — CARE COORDINATION ASSESSMENT. - PRO ARRIVE FROM
DX:79 y/o female with right hip Osteoarthritis  Osteoarthritis of right hip.  Plan: scheduled for right hip replacement on 5/23/24.        CM met patient at bedside. Patient is alert times 3. Patient stated she lives with Spouse Chase [phone], CM called and left a message will continue to follow up. Patient stated she does not have a walker or cane at home. Patient stated has 1 step to enter in the house and 3 or 4 steps in house.       CM verified:    PCP: Alvin Herrera [phone].  Pharmacy: [facility].  Insurance: Medicare /GHI.  [place]: Patient stated NO.    CM explained about homecare services with patient with a verbal understanding. Patient choice [facility] care agency [phone] will reach out to you within 24-72 hours of your discharge to schedule home care visit/eval appointment with you. Please call agency for any queries regarding home care services, CM setup referral and will continue to follow case./home

## 2024-05-23 NOTE — DISCHARGE NOTE PROVIDER - HOSPITAL COURSE
This patient is a 78 y.o. female with severe osteoarthritis of the right hip.  After admission on 5/23/24 and receiving pre-operative parenteral prophylactic antibiotics, the patient  underwent an  uncomplicated right anterior total hip replacement by Dr. Rossi.    A medical consultation from the Hospitalist service was obtained for post-operative medical co-management.   Typical Physical & occupational therapy modalities post total hip replacement were performed including ambulation training, range of motion, ADL's, and transfers.  Aspirin was given for DVT prophylaxis.  The patient had a clean appearing surgical incision with no sign of surgical site infections and had a stable neuro / vascular exam of the operated extremity.     Discharge and Orthopedic Care instructions were delineated in the Discharge Plan and reviewed with the patient.   All medications were delineated in the medication reconciliation tool and key points were reviewed with the patient.   The patient was deemed stable from an Orthopedic & medical standpoint for discharge today.  She will  follow up with Dr. Rossi for further orthopedic care upon completion of Home care PT. This patient is a 78 y.o. female with severe osteoarthritis of the right hip.  After admission on 5/23/24 and receiving pre-operative parenteral prophylactic antibiotics, the patient  underwent an  uncomplicated right anterior total hip replacement by Dr. Rossi.    A medical consultation from the Hospitalist service was obtained for post-operative medical co-management.   Typical Physical & occupational therapy modalities post total hip replacement were performed including ambulation training, range of motion, ADL's, and transfers.  Aspirin was given for DVT prophylaxis.  The patient had a clean appearing surgical incision with no sign of surgical site infections and had a stable neuro / vascular exam of the operated extremity.     Discharge and Orthopedic Care instructions were delineated in the Discharge Plan and reviewed with the patient.   All medications were delineated in the medication reconciliation tool and key points were reviewed with the patient.   The patient was deemed stable from an Orthopedic & medical standpoint for discharge today.  She will  follow up with Dr. Rossi for further orthopedic care upon completion of JUAN ANTONIO

## 2024-05-23 NOTE — DISCHARGE NOTE PROVIDER - CARE PROVIDER_API CALL
Jaxson Rossi  Orthopaedic Surgery  [address], [address]  [place] [address]  Phone: [phone]  Fax: [phone]  Follow Up Time: 2 weeks   Jaxson Rossi  Orthopaedic Surgery  [address], [address]  [place] [address]  Phone: [phone]  Fax: [phone]  Established Patient  Scheduled Appointment: 06/05/2024 11:00 AM

## 2024-05-23 NOTE — OCCUPATIONAL THERAPY INITIAL EVALUATION ADULT - ADDITIONAL COMMENTS
Pt lives in a private home 3 +1 WILLIAMS +railing. Pt is planning on residing on the main floor upon d/c. +stall with grab bars. PTA Independent with ADL's and functional transfers   Pt does not own any DME/AE/AD at this time

## 2024-05-23 NOTE — CARE COORDINATION ASSESSMENT. - OTHER PERTINENT DISCHARGE PLANNING INFORMATION:
79 y/o female with right hip Osteoarthritis. Osteoarthritis of right hip.   ·   Plan: scheduled for right hip replacement on 5/23/24. Met patient at bedside.  Explained role of CM, verbalized understanding. Pt was made aware a CM will remain available through hospitalization.  Contact information given in discharge/ transitions resource folder.

## 2024-05-23 NOTE — BRIEF OPERATIVE NOTE - NSICDXBRIEFPOSTOP_GEN_ALL_CORE_FT
POST-OP DIAGNOSIS:  Degenerative joint disease (DJD) of hip 23-May-2024 10:08:45 right Raimundo Mendoza

## 2024-05-23 NOTE — OCCUPATIONAL THERAPY INITIAL EVALUATION ADULT - NSTOILETINGEQUIP_GEN_A_OT
The patient is physically incapable of utilizing a regular toilet facilitythe pt is confined to a single room with no toilet/3:1 commode

## 2024-05-23 NOTE — DISCHARGE NOTE PROVIDER - NSDCMRMEDTOKEN_GEN_ALL_CORE_FT
ezetimibe 10 mg oral tablet: 1 tab(s) orally once a day (at bedtime)  mupirocin 2% topical ointment: 1 application in each nostril 2 times a day  rosuvastatin 10 mg oral tablet: 1 tab(s) orally once a day  telmisartan 20 mg oral tablet: 1 tab(s) orally once a day   acetaminophen 500 mg oral tablet: 2 tab(s) orally every 8 hours  aspirin 325 mg oral delayed release tablet: 1 tab(s) orally every 12 hours 2 hours prior to celebrex prevention  of  blood clots MDD: 2  CeleBREX 200 mg oral capsule: 1 cap(s) orally every 12 hours 2 hours after aspirin prevention of  HO MDD: 2  ezetimibe 10 mg oral tablet: 1 tab(s) orally once a day (at bedtime)  omeprazole 20 mg oral delayed release tablet: 1 tab(s) orally MDD: 1  oxyCODONE 5 mg oral tablet: 1 tab(s) orally every 4 hours as needed for  severe pain MDD: 6  rosuvastatin 10 mg oral tablet: 1 tab(s) orally once a day  senna leaf extract oral tablet: 2 tab(s) orally once a day (at bedtime)  telmisartan 20 mg oral tablet: 1 tab(s) orally once a day   acetaminophen 500 mg oral tablet: 2 tab(s) orally every 8 hours  aspirin 325 mg oral delayed release tablet: 1 tab(s) orally every 12 hours 2 hours prior to celebrex prevention  of  blood clots MDD: 2  CeleBREX 200 mg oral capsule: 1 cap(s) orally every 12 hours 2 hours after aspirin prevention of  HO MDD: 2  ezetimibe 10 mg oral tablet: 1 tab(s) orally once a day (at bedtime)  omeprazole 20 mg oral delayed release tablet: 1 tab(s) orally once a day before breakfast MDD: 1  oxyCODONE 5 mg oral tablet: 1 tab(s) orally every 4 hours as needed for  severe pain MDD: 6  polyethylene glycol 3350 oral powder for reconstitution: 17 gram(s) orally once a day (at bedtime)  rosuvastatin 10 mg oral tablet: 1 tab(s) orally once a day  senna leaf extract oral tablet: 2 tab(s) orally once a day (at bedtime)  telmisartan 20 mg oral tablet: 1 tab(s) orally once a day

## 2024-05-23 NOTE — CARE COORDINATION ASSESSMENT. - NSDCPLANSERVICES_GEN_ALL_CORE
DX:79 y/o female with right hip Osteoarthritis  Osteoarthritis of right hip.  Plan: scheduled for right hip replacement on 5/23/24.        CM met patient at bedside. Patient is alert times 3. Patient stated she lives with Spouse Chase [phone], CM called and left a message will continue to follow up. Patient stated she does not have a walker or cane at home. Patient stated has 1 step to enter in the house and 3 or 4 steps in house.       CM verified:    PCP: Alvin Herrera [phone].  Pharmacy: [facility].  Insurance: Medicare /[employer]I.  [place]: Patient stated NO.    CM explained about homecare services with patient with a verbal understanding. Patient choice [facility] care agency [phone] will reach out to you within 24-72 hours of your discharge to schedule home care visit/eval appointment with you. Please call agency for any queries regarding home care services, CM setup referral and will continue to follow case./Home Care

## 2024-05-24 ENCOUNTER — TRANSCRIPTION ENCOUNTER (OUTPATIENT)
Age: 79
End: 2024-05-24

## 2024-05-24 LAB
ANION GAP SERPL CALC-SCNC: 5 MMOL/L — SIGNIFICANT CHANGE UP (ref 5–17)
BUN SERPL-MCNC: 9 MG/DL — SIGNIFICANT CHANGE UP (ref 7–23)
CALCIUM SERPL-MCNC: 9 MG/DL — SIGNIFICANT CHANGE UP (ref 8.4–10.5)
CHLORIDE SERPL-SCNC: 107 MMOL/L — SIGNIFICANT CHANGE UP (ref 96–108)
CO2 SERPL-SCNC: 29 MMOL/L — SIGNIFICANT CHANGE UP (ref 22–31)
CREAT SERPL-MCNC: 0.74 MG/DL — SIGNIFICANT CHANGE UP (ref 0.5–1.3)
EGFR: 84 ML/MIN/1.73M2 — SIGNIFICANT CHANGE UP
GLUCOSE SERPL-MCNC: 100 MG/DL — HIGH (ref 70–99)
HCT VFR BLD CALC: 30.4 % — LOW (ref 34.5–45)
HGB BLD-MCNC: 9.7 G/DL — LOW (ref 11.5–15.5)
MCHC RBC-ENTMCNC: 29.6 PG — SIGNIFICANT CHANGE UP (ref 27–34)
MCHC RBC-ENTMCNC: 31.9 GM/DL — LOW (ref 32–36)
MCV RBC AUTO: 92.7 FL — SIGNIFICANT CHANGE UP (ref 80–100)
NRBC # BLD: 0 /100 WBCS — SIGNIFICANT CHANGE UP (ref 0–0)
PLATELET # BLD AUTO: 155 K/UL — SIGNIFICANT CHANGE UP (ref 150–400)
POTASSIUM SERPL-MCNC: 4.3 MMOL/L — SIGNIFICANT CHANGE UP (ref 3.5–5.3)
POTASSIUM SERPL-SCNC: 4.3 MMOL/L — SIGNIFICANT CHANGE UP (ref 3.5–5.3)
RBC # BLD: 3.28 M/UL — LOW (ref 3.8–5.2)
RBC # FLD: 11.9 % — SIGNIFICANT CHANGE UP (ref 10.3–14.5)
SODIUM SERPL-SCNC: 141 MMOL/L — SIGNIFICANT CHANGE UP (ref 135–145)
WBC # BLD: 5.68 K/UL — SIGNIFICANT CHANGE UP (ref 3.8–10.5)
WBC # FLD AUTO: 5.68 K/UL — SIGNIFICANT CHANGE UP (ref 3.8–10.5)

## 2024-05-24 PROCEDURE — 99233 SBSQ HOSP IP/OBS HIGH 50: CPT

## 2024-05-24 RX ORDER — CELECOXIB 200 MG/1
1 CAPSULE ORAL
Qty: 28 | Refills: 0
Start: 2024-05-24 | End: 2024-06-06

## 2024-05-24 RX ORDER — OXYCODONE HYDROCHLORIDE 5 MG/1
1 TABLET ORAL
Qty: 42 | Refills: 0
Start: 2024-05-24 | End: 2024-05-30

## 2024-05-24 RX ORDER — ASPIRIN/CALCIUM CARB/MAGNESIUM 324 MG
1 TABLET ORAL
Qty: 56 | Refills: 0
Start: 2024-05-24 | End: 2024-06-20

## 2024-05-24 RX ORDER — OMEPRAZOLE 10 MG/1
1 CAPSULE, DELAYED RELEASE ORAL
Qty: 30 | Refills: 0
Start: 2024-05-24 | End: 2024-06-22

## 2024-05-24 RX ORDER — CELECOXIB 200 MG/1
1 CAPSULE ORAL
Qty: 60 | Refills: 0
Start: 2024-05-24 | End: 2024-06-22

## 2024-05-24 RX ORDER — SENNA PLUS 8.6 MG/1
2 TABLET ORAL
Qty: 0 | Refills: 0 | DISCHARGE
Start: 2024-05-24

## 2024-05-24 RX ORDER — ACETAMINOPHEN 500 MG
2 TABLET ORAL
Qty: 0 | Refills: 0 | DISCHARGE
Start: 2024-05-24

## 2024-05-24 RX ADMIN — Medication 101.6 MILLIGRAM(S): at 05:23

## 2024-05-24 RX ADMIN — SENNA PLUS 2 TABLET(S): 8.6 TABLET ORAL at 22:32

## 2024-05-24 RX ADMIN — PANTOPRAZOLE SODIUM 40 MILLIGRAM(S): 20 TABLET, DELAYED RELEASE ORAL at 05:24

## 2024-05-24 RX ADMIN — Medication 1000 MILLIGRAM(S): at 22:35

## 2024-05-24 RX ADMIN — Medication 325 MILLIGRAM(S): at 17:33

## 2024-05-24 RX ADMIN — CELECOXIB 200 MILLIGRAM(S): 200 CAPSULE ORAL at 09:28

## 2024-05-24 RX ADMIN — ONDANSETRON 4 MILLIGRAM(S): 8 TABLET, FILM COATED ORAL at 09:35

## 2024-05-24 RX ADMIN — Medication 1000 MILLIGRAM(S): at 05:23

## 2024-05-24 RX ADMIN — SODIUM CHLORIDE 125 MILLILITER(S): 9 INJECTION, SOLUTION INTRAVENOUS at 08:28

## 2024-05-24 RX ADMIN — Medication 1000 MILLIGRAM(S): at 22:32

## 2024-05-24 RX ADMIN — CELECOXIB 200 MILLIGRAM(S): 200 CAPSULE ORAL at 09:58

## 2024-05-24 RX ADMIN — CELECOXIB 200 MILLIGRAM(S): 200 CAPSULE ORAL at 22:35

## 2024-05-24 RX ADMIN — ATORVASTATIN CALCIUM 40 MILLIGRAM(S): 80 TABLET, FILM COATED ORAL at 22:30

## 2024-05-24 RX ADMIN — CELECOXIB 200 MILLIGRAM(S): 200 CAPSULE ORAL at 22:31

## 2024-05-24 RX ADMIN — Medication 1000 MILLIGRAM(S): at 13:12

## 2024-05-24 RX ADMIN — SODIUM CHLORIDE 125 MILLILITER(S): 9 INJECTION, SOLUTION INTRAVENOUS at 05:24

## 2024-05-24 RX ADMIN — Medication 1000 MILLIGRAM(S): at 13:50

## 2024-05-24 RX ADMIN — Medication 1000 MILLIGRAM(S): at 07:04

## 2024-05-24 RX ADMIN — Medication 325 MILLIGRAM(S): at 05:23

## 2024-05-24 NOTE — CONSULT NOTE ADULT - ASSESSMENT
78F HTN, HLD and OA admitted for aftercare following Right THR    S/P Right THR  POD 0    Continue Bowel and pain control regimen;    Incentive Spirometer for lung expansion;   Monitor Hgb and follow up electrolytes.      HTN / HLD   Hold Telmisartan until POD2  Continue Statin    Diet  Regular    DVT Prophylaxis   Aspirin BID    Disposition  Discharge planning pending hospital course  78F HTN, HLD and OA admitted for aftercare following Right THR    S/P Right THR  POD 1   Continue Bowel and pain control regimen;    Incentive Spirometer for lung expansion;   Monitor Hgb and follow up electrolytes.      Light Headedness  Combination of Anesthesia and possible pain medicatons  Component of Anemia but not severe enough to be symptomatic  Given IVF and will give her some more time and follow up    HTN / HLD   Hold Telmisartan until POD2  Continue Statin    Diet  Regular    DVT Prophylaxis   Aspirin BID    Disposition  Discharge planning pending hospital course

## 2024-05-24 NOTE — DISCHARGE NOTE NURSING/CASE MANAGEMENT/SOCIAL WORK - PATIENT PORTAL LINK FT
You can access the FollowMyHealth Patient Portal offered by [facility] by registering at the following website: [email or web address]. By joining [email or web address]’s FollowMyHealth portal, you will also be able to view your health information using other applications (apps) compatible with our system.

## 2024-05-24 NOTE — DISCHARGE NOTE NURSING/CASE MANAGEMENT/SOCIAL WORK - NSSCNAMETXT_GEN_ALL_CORE
[facility] care agency [phone] will reach out to you within 24-72 hours of your discharge to schedule home care visit/eval appointment with you. Please call agency for any queries regarding home care services

## 2024-05-24 NOTE — DISCHARGE NOTE NURSING/CASE MANAGEMENT/SOCIAL WORK - NSDCFUADDAPPT_GEN_ALL_CORE_FT
Follow up with your primary care provider within the next 2 weeks to ensure your medications are appropriate and there are no underlying problems after your procedure. Physical Therapist to visit the day after hospital discharge; Registered Nurse to follow if there is a need. Please contact the home care agency at the above phone number if you have not heard from them by 12 noon on the day after your hospital discharge.

## 2024-05-24 NOTE — CASE MANAGEMENT PROGRESS NOTE - NSCMPROGRESSNOTE_GEN_ALL_CORE
The patient is confined to one level of the home and there is no toilet on that level.  The patient will need a commode to go home with because she  is confined to one level of the home and there is no toilet on that level.

## 2024-05-24 NOTE — PHARMACOTHERAPY INTERVENTION NOTE - COMMENTS
Admission medication reconciliation POD1  
Transition of Care video discharge education - medication calendar given to patient

## 2024-05-24 NOTE — CONSULT NOTE ADULT - SUBJECTIVE AND OBJECTIVE BOX
HPI: 78F HTN, HLD and OA  has been combatting pain in right hip for several years which has progressively worsened.  Patient has tried multiple options for pain relief including OTC medication and as well as PT with minimal relief and has undergone elective replacement of right hip successfully.  Patient is currently resting in bed comfortable with good pain control.     REVIEW OF SYSTEMS:  CONSTITUTIONAL: No fever, weight loss, or fatigue  EYES: No eye pain, visual disturbances, or discharge  ENMT:  No difficulty hearing, tinnitus, vertigo; No sinus or throat pain  NECK: No pain or stiffness  RESPIRATORY: No cough, wheezing, chills or hemoptysis; No shortness of breath  CARDIOVASCULAR: No chest pain, palpitations, dizziness, or leg swelling  GASTROINTESTINAL: No abdominal or epigastric pain. No nausea, vomiting, or hematemesis; No diarrhea or constipation. No melena or hematochezia.  GENITOURINARY: No dysuria, frequency, hematuria, or incontinence  NEUROLOGICAL: No headaches, memory loss, loss of strength, numbness, or tremors  MUSCULOSKELETAL: No muscle or back pain      PAST MEDICAL & SURGICAL HISTORY:  HTN (hypertension)      HLD (hyperlipidemia)      Osteoarthritis of right hip      Venous insufficiency      Mitral valve prolapse      S/P partial hysterectomy      S/P right rotator cuff repair          SOCIAL HISTORY:  Tobacco; EtOH; Illicit Drugs    Allergies    No Known Allergies    Intolerances        MEDICATIONS  (STANDING):  acetaminophen     Tablet .. 1000 milliGRAM(s) Oral every 8 hours  aspirin enteric coated 325 milliGRAM(s) Oral every 12 hours  atorvastatin 40 milliGRAM(s) Oral at bedtime  celecoxib 200 milliGRAM(s) Oral every 12 hours  lactated ringers. 1000 milliLiter(s) (125 mL/Hr) IV Continuous <Continuous>  losartan 25 milliGRAM(s) Oral daily  pantoprazole    Tablet 40 milliGRAM(s) Oral before breakfast  polyethylene glycol 3350 17 Gram(s) Oral at bedtime  senna 2 Tablet(s) Oral at bedtime    MEDICATIONS  (PRN):  aluminum hydroxide/magnesium hydroxide/simethicone Suspension 30 milliLiter(s) Oral four times a day PRN Indigestion  HYDROmorphone  Injectable 0.5 milliGRAM(s) IV Push every 3 hours PRN Breakthrough pain  magnesium hydroxide Suspension 30 milliLiter(s) Oral daily PRN Constipation  ondansetron Injectable 4 milliGRAM(s) IV Push every 6 hours PRN Nausea and/or Vomiting  oxyCODONE    IR 5 milliGRAM(s) Oral every 3 hours PRN Moderate Pain (4 - 6)  oxyCODONE    IR 10 milliGRAM(s) Oral every 3 hours PRN Severe Pain (7 - 10)      FAMILY HISTORY:  FHx: stroke (Mother)    Family history of heart attack (Father)        Vital Signs Last 24 Hrs  T(C): 36.8 (24 May 2024 07:52), Max: 36.9 (23 May 2024 20:08)  T(F): 98.2 (24 May 2024 07:52), Max: 98.4 (23 May 2024 20:08)  HR: 78 (24 May 2024 07:52) (58 - 91)  BP: 110/65 (24 May 2024 07:52) (100/63 - 145/84)  BP(mean): --  RR: 20 (24 May 2024 07:52) (14 - 20)  SpO2: 93% (24 May 2024 07:52) (93% - 99%)    Parameters below as of 24 May 2024 07:52  Patient On (Oxygen Delivery Method): room air        PHYSICAL EXAM:    GENERAL: NAD, well-developed  HEAD:  Atraumatic, Normocephalic  EYES: EOMI, PERRLA, conjunctiva and sclera clear  ENMT: No tonsillar erythema, exudates, or enlargement; Moist mucous membranes, Good dentition, No lesions  NECK: Supple, No JVD, Normal thyroid  NERVOUS SYSTEM:  Alert & Oriented X3, Good concentration;  CHEST/LUNG: Clear to auscultation bilaterally; No rales, rhonchi, wheezing, or rubs  HEART: Regular rate and rhythm; No murmurs, rubs, or gallops  ABDOMEN: Soft, Nontender, Nondistended; Bowel sounds present  EXTREMITIES:  2+ Peripheral Pulses, No clubbing, cyanosis, or edema      LABS:                        9.7    5.68  )-----------( 155      ( 24 May 2024 08:59 )             30.4     05-24    141  |  107  |  9   ----------------------------<  100<H>  4.3   |  29  |  0.74    Ca    9.0      24 May 2024 08:59        Urinalysis Basic - ( 24 May 2024 08:59 )    Color: x / Appearance: x / SG: x / pH: x  Gluc: 100 mg/dL / Ketone: x  / Bili: x / Urobili: x   Blood: x / Protein: x / Nitrite: x   Leuk Esterase: x / RBC: x / WBC x   Sq Epi: x / Non Sq Epi: x / Bacteria: x      CAPILLARY BLOOD GLUCOSE          RADIOLOGY & ADDITIONAL STUDIES:    EKG:   Personally Reviewed:  [ ] YES     Imaging:   Personally Reviewed:  [ ] YES     Consultant(s) Notes Reviewed:      Care Discussed with Consultants/Other Providers:                HPI: 78F HTN, HLD and OA  has been combatting pain in right hip for several years which has progressively worsened.  Patient has tried multiple options for pain relief including OTC medication and as well as PT with minimal relief and has undergone elective replacement of right hip successfully.  Patient is currently resting in bed comfortable with good pain control. She is complaining of light headedness and dizziness once she stands up.  It was present yesterday post operatively and has gotten better.  She did take Oxycodone yesterday but not today.    REVIEW OF SYSTEMS:  CONSTITUTIONAL: No fever, weight loss, or fatigue  EYES: No eye pain, visual disturbances, or discharge  ENMT:  No difficulty hearing, tinnitus, vertigo; No sinus or throat pain  NECK: No pain or stiffness  RESPIRATORY: No cough, wheezing, chills or hemoptysis; No shortness of breath  CARDIOVASCULAR: No chest pain, palpitations, dizziness, or leg swelling  GASTROINTESTINAL: No abdominal or epigastric pain. No nausea, vomiting, or hematemesis; No diarrhea or constipation. No melena or hematochezia.  GENITOURINARY: No dysuria, frequency, hematuria, or incontinence  NEUROLOGICAL: No headaches, memory loss, loss of strength, numbness, or tremors  MUSCULOSKELETAL: No muscle or back pain      PAST MEDICAL & SURGICAL HISTORY:  HTN (hypertension)      HLD (hyperlipidemia)      Osteoarthritis of right hip      Venous insufficiency      Mitral valve prolapse      S/P partial hysterectomy      S/P right rotator cuff repair          SOCIAL HISTORY:  Tobacco; EtOH; Illicit Drugs    Allergies    No Known Allergies    Intolerances        MEDICATIONS  (STANDING):  acetaminophen     Tablet .. 1000 milliGRAM(s) Oral every 8 hours  aspirin enteric coated 325 milliGRAM(s) Oral every 12 hours  atorvastatin 40 milliGRAM(s) Oral at bedtime  celecoxib 200 milliGRAM(s) Oral every 12 hours  lactated ringers. 1000 milliLiter(s) (125 mL/Hr) IV Continuous <Continuous>  losartan 25 milliGRAM(s) Oral daily  pantoprazole    Tablet 40 milliGRAM(s) Oral before breakfast  polyethylene glycol 3350 17 Gram(s) Oral at bedtime  senna 2 Tablet(s) Oral at bedtime    MEDICATIONS  (PRN):  aluminum hydroxide/magnesium hydroxide/simethicone Suspension 30 milliLiter(s) Oral four times a day PRN Indigestion  HYDROmorphone  Injectable 0.5 milliGRAM(s) IV Push every 3 hours PRN Breakthrough pain  magnesium hydroxide Suspension 30 milliLiter(s) Oral daily PRN Constipation  ondansetron Injectable 4 milliGRAM(s) IV Push every 6 hours PRN Nausea and/or Vomiting  oxyCODONE    IR 5 milliGRAM(s) Oral every 3 hours PRN Moderate Pain (4 - 6)  oxyCODONE    IR 10 milliGRAM(s) Oral every 3 hours PRN Severe Pain (7 - 10)      FAMILY HISTORY:  FHx: stroke (Mother)    Family history of heart attack (Father)        Vital Signs Last 24 Hrs  T(C): 36.8 (24 May 2024 07:52), Max: 36.9 (23 May 2024 20:08)  T(F): 98.2 (24 May 2024 07:52), Max: 98.4 (23 May 2024 20:08)  HR: 78 (24 May 2024 07:52) (58 - 91)  BP: 110/65 (24 May 2024 07:52) (100/63 - 145/84)  BP(mean): --  RR: 20 (24 May 2024 07:52) (14 - 20)  SpO2: 93% (24 May 2024 07:52) (93% - 99%)    Parameters below as of 24 May 2024 07:52  Patient On (Oxygen Delivery Method): room air        PHYSICAL EXAM:    GENERAL: NAD, well-developed  HEAD:  Atraumatic, Normocephalic  EYES: EOMI, PERRLA, conjunctiva and sclera clear  ENMT: No tonsillar erythema, exudates, or enlargement; Moist mucous membranes, Good dentition, No lesions  NECK: Supple, No JVD, Normal thyroid  NERVOUS SYSTEM:  Alert & Oriented X3, Good concentration;  CHEST/LUNG: Clear to auscultation bilaterally; No rales, rhonchi, wheezing, or rubs  HEART: Regular rate and rhythm; No murmurs, rubs, or gallops  ABDOMEN: Soft, Nontender, Nondistended; Bowel sounds present  EXTREMITIES:  2+ Peripheral Pulses, No clubbing, cyanosis, or edema      LABS:                        9.7    5.68  )-----------( 155      ( 24 May 2024 08:59 )             30.4     05-24    141  |  107  |  9   ----------------------------<  100<H>  4.3   |  29  |  0.74    Ca    9.0      24 May 2024 08:59        Urinalysis Basic - ( 24 May 2024 08:59 )    Color: x / Appearance: x / SG: x / pH: x  Gluc: 100 mg/dL / Ketone: x  / Bili: x / Urobili: x   Blood: x / Protein: x / Nitrite: x   Leuk Esterase: x / RBC: x / WBC x   Sq Epi: x / Non Sq Epi: x / Bacteria: x      CAPILLARY BLOOD GLUCOSE          RADIOLOGY & ADDITIONAL STUDIES:    EKG:   Personally Reviewed:  [ ] YES     Imaging:   Personally Reviewed:  [ ] YES     Consultant(s) Notes Reviewed:      Care Discussed with Consultants/Other Providers:

## 2024-05-24 NOTE — DISCHARGE NOTE NURSING/CASE MANAGEMENT/SOCIAL WORK - NSDCPNDISPN_GEN_ALL_CORE
11-Feb-2022 00:00 Education provided on the pain management plan of care/Side effects of pain management treatment/Activities of daily living, including home environment that might     exacerbate pain or reduce effectiveness of the pain management plan of care as well as strategies to address these issues/Safe use, storage and disposal of opioids when prescribed/Opioids not applicable/not prescribed

## 2024-05-24 NOTE — CASE MANAGEMENT PROGRESS NOTE - NSCMPROGRESSNOTE_GEN_ALL_CORE
PT recommending [facility] for patient. Patient gave me 3 choices  [facility], [facility], and [facility].  CM made SW Team Aware.  CM spoke with [facility] [phone] representative [employer] and [employer] was covered, CM gave equipment to patient at bedside her daughter is taking equipment home for her mother. Will continue to follow patient.

## 2024-05-24 NOTE — PROGRESS NOTE ADULT - ASSESSMENT
Discharge medication calendar:  GOM222 mg bid x 4 weeks  Celebrex 200 mg BID x 2 weeks  APAP 1000mg q8h x 2-3 weeks  Narcotic PRN  Docusate 100mg TID while taking narcotic  Miralax, Senna, or Bisacodyl PRN for treatment of constipation

## 2024-05-25 VITALS
RESPIRATION RATE: 16 BRPM | SYSTOLIC BLOOD PRESSURE: 133 MMHG | TEMPERATURE: 98 F | HEART RATE: 76 BPM | OXYGEN SATURATION: 96 % | DIASTOLIC BLOOD PRESSURE: 72 MMHG

## 2024-05-25 LAB
ANION GAP SERPL CALC-SCNC: 7 MMOL/L — SIGNIFICANT CHANGE UP (ref 5–17)
BUN SERPL-MCNC: 17 MG/DL — SIGNIFICANT CHANGE UP (ref 7–23)
CALCIUM SERPL-MCNC: 9.2 MG/DL — SIGNIFICANT CHANGE UP (ref 8.4–10.5)
CHLORIDE SERPL-SCNC: 108 MMOL/L — SIGNIFICANT CHANGE UP (ref 96–108)
CO2 SERPL-SCNC: 28 MMOL/L — SIGNIFICANT CHANGE UP (ref 22–31)
CREAT SERPL-MCNC: 0.79 MG/DL — SIGNIFICANT CHANGE UP (ref 0.5–1.3)
EGFR: 77 ML/MIN/1.73M2 — SIGNIFICANT CHANGE UP
GLUCOSE SERPL-MCNC: 105 MG/DL — HIGH (ref 70–99)
HCT VFR BLD CALC: 31 % — LOW (ref 34.5–45)
HGB BLD-MCNC: 10 G/DL — LOW (ref 11.5–15.5)
MCHC RBC-ENTMCNC: 30 PG — SIGNIFICANT CHANGE UP (ref 27–34)
MCHC RBC-ENTMCNC: 32.3 GM/DL — SIGNIFICANT CHANGE UP (ref 32–36)
MCV RBC AUTO: 93.1 FL — SIGNIFICANT CHANGE UP (ref 80–100)
NRBC # BLD: 0 /100 WBCS — SIGNIFICANT CHANGE UP (ref 0–0)
PLATELET # BLD AUTO: 156 K/UL — SIGNIFICANT CHANGE UP (ref 150–400)
POTASSIUM SERPL-MCNC: 4.3 MMOL/L — SIGNIFICANT CHANGE UP (ref 3.5–5.3)
POTASSIUM SERPL-SCNC: 4.3 MMOL/L — SIGNIFICANT CHANGE UP (ref 3.5–5.3)
RBC # BLD: 3.33 M/UL — LOW (ref 3.8–5.2)
RBC # FLD: 12 % — SIGNIFICANT CHANGE UP (ref 10.3–14.5)
SODIUM SERPL-SCNC: 143 MMOL/L — SIGNIFICANT CHANGE UP (ref 135–145)
WBC # BLD: 11.37 K/UL — HIGH (ref 3.8–10.5)
WBC # FLD AUTO: 11.37 K/UL — HIGH (ref 3.8–10.5)

## 2024-05-25 PROCEDURE — C1889: CPT

## 2024-05-25 PROCEDURE — 80048 BASIC METABOLIC PNL TOTAL CA: CPT

## 2024-05-25 PROCEDURE — 36415 COLL VENOUS BLD VENIPUNCTURE: CPT

## 2024-05-25 PROCEDURE — 94664 DEMO&/EVAL PT USE INHALER: CPT

## 2024-05-25 PROCEDURE — 97161 PT EVAL LOW COMPLEX 20 MIN: CPT

## 2024-05-25 PROCEDURE — 97165 OT EVAL LOW COMPLEX 30 MIN: CPT

## 2024-05-25 PROCEDURE — 97530 THERAPEUTIC ACTIVITIES: CPT

## 2024-05-25 PROCEDURE — 76000 FLUOROSCOPY <1 HR PHYS/QHP: CPT

## 2024-05-25 PROCEDURE — 97116 GAIT TRAINING THERAPY: CPT

## 2024-05-25 PROCEDURE — 97535 SELF CARE MNGMENT TRAINING: CPT

## 2024-05-25 PROCEDURE — C1776: CPT

## 2024-05-25 PROCEDURE — 97110 THERAPEUTIC EXERCISES: CPT

## 2024-05-25 PROCEDURE — 85027 COMPLETE CBC AUTOMATED: CPT

## 2024-05-25 PROCEDURE — 99232 SBSQ HOSP IP/OBS MODERATE 35: CPT

## 2024-05-25 RX ORDER — POLYETHYLENE GLYCOL 3350 17 G/17G
17 POWDER, FOR SOLUTION ORAL
Qty: 0 | Refills: 0 | DISCHARGE
Start: 2024-05-25

## 2024-05-25 RX ADMIN — Medication 1000 MILLIGRAM(S): at 06:09

## 2024-05-25 RX ADMIN — Medication 1000 MILLIGRAM(S): at 13:56

## 2024-05-25 RX ADMIN — LOSARTAN POTASSIUM 25 MILLIGRAM(S): 100 TABLET, FILM COATED ORAL at 06:11

## 2024-05-25 RX ADMIN — CELECOXIB 200 MILLIGRAM(S): 200 CAPSULE ORAL at 13:56

## 2024-05-25 RX ADMIN — Medication 1000 MILLIGRAM(S): at 06:14

## 2024-05-25 RX ADMIN — PANTOPRAZOLE SODIUM 40 MILLIGRAM(S): 20 TABLET, DELAYED RELEASE ORAL at 06:08

## 2024-05-25 RX ADMIN — Medication 325 MILLIGRAM(S): at 08:33

## 2024-05-25 NOTE — PROGRESS NOTE ADULT - SUBJECTIVE AND OBJECTIVE BOX
Post Op     JOIE NICOLE      78y        Female                                                                                                                 T(C): 36.1 (05-23-24 @ 09:34), Max: 36.6 (05-23-24 @ 06:30)  HR: 62 (05-23-24 @ 10:20) (59 - 81)  BP: 101/62 (05-23-24 @ 10:20) (95/57 - 132/79)  RR: 16 (05-23-24 @ 10:20) (12 - 16)  SpO2: 99% (05-23-24 @ 10:20) (97% - 100%)  Wt(kg): --    S/P  total hip    Patient denies shortness of breath, chest pain, dyspnea, No complaints  Pain is 3 /10    Physical Exam    Extremity: Bilaterally:  No holmon                                           No Cord                                          PAS on                                          Neurovascular intact                                          Motor intact EHL/FHL decrease 2ndery to spinal  2/5                                           Sensation intact                                          Pulses intact DP/PT                                         Calves Soft                                         Dressing Clean / Dry / Intact                                          Capillary refill with 5 seconds                A/P  -- S/P total anterior hip     -  Medicine To Follow   - DVT prophylaxis PAS [id number] po bid  - PT & OT   - Analagesia  - Incentive Spirometry  - Discharge Planning  - Safety Precautions  -  CBC , BMP daily    
Post Op     JOIE NICOLE      78y        Female                                                                                                                 T(C): 36.8 (05-24-24 @ 07:52), Max: 36.9 (05-23-24 @ 20:08)  HR: 78 (05-24-24 @ 07:52) (58 - 91)  BP: 110/65 (05-24-24 @ 07:52) (95/57 - 145/84)  RR: 20 (05-24-24 @ 07:52) (12 - 20)  SpO2: 93% (05-24-24 @ 07:52) (93% - 100%)  Wt(kg): --    S/P   anterior total hip replacement    Patient denies shortness of breath, chest pain, dyspnea, No complaints  Pain is 3 /10    Physical Exam    Extremity: Bilaterally:  No holmon                                           No Cord                                          PAS on                                          Neurovascular intact                                          Motor intact EHL/FHL                                          Sensation intact                                          Pulses intact DP/PT                                         Calves Soft                                         Dressing Clean / Dry / Intact meplix                                         Capillary refill with 5 seconds                A/P  -- S/P total hip right anterior    -  Medicine To Follow   - DVT prophylaxis PAS asa 325mg po bid  - PT & OT   - Analagesia  - Incentive Spirometry  - Discharge Planning  - Safety Precautions  -  CBC , BMP daily    
POST OPERATIVE DAY #: 2    78y Female  s/p    Right   AnteriorTHR                        SUBJECTIVE: Patient seen and examined at bedside.     Pain:  well controlled   Pain scale:  2 /10  Denies CP, SOB, N/V/D, weakness, numbness   No new complains     OBJECTIVE:     Vital Signs Last 24 Hrs  T(C): 36.7 (25 May 2024 08:38), Max: 36.7 (24 May 2024 15:10)  T(F): 98 (25 May 2024 08:38), Max: 98 (24 May 2024 15:10)  HR: 71 (25 May 2024 08:38) (69 - 80)  BP: 116/65 (25 May 2024 08:38) (103/61 - 125/73)  BP(mean): --  RR: 17 (25 May 2024 08:38) (16 - 18)  SpO2: 97% (25 May 2024 08:38) (93% - 97%)    Parameters below as of 25 May 2024 08:38  Patient On (Oxygen Delivery Method): room air        Affected extremity: RLE         Dressing: meliplex  clean/dry/intact                             Sensation: intact to light touch          Motor exam:   5/ 5 Tibialis Anterior/Gastrocnemius-Soleus, EHL/FHL         warm, well-perfused; capillary refill < 3 seconds         negative calf tenderness B/L LE    LABS:                        10.0   11.37 )-----------( 156      ( 25 May 2024 06:49 )             31.0     05-25    143  |  108  |  17  ----------------------------<  105<H>  4.3   |  28  |  0.79    Ca    9.2      25 May 2024 06:49        I&O's Detail      MEDICATIONS:      Pain management:  acetaminophen     Tablet .. 1000 milliGRAM(s) Oral every 8 hours  celecoxib 200 milliGRAM(s) Oral every 12 hours  HYDROmorphone  Injectable 0.5 milliGRAM(s) IV Push every 3 hours PRN  ondansetron Injectable 4 milliGRAM(s) IV Push every 6 hours PRN  oxyCODONE    IR 10 milliGRAM(s) Oral every 3 hours PRN  oxyCODONE    IR 5 milliGRAM(s) Oral every 3 hours PRN    DVT prophylaxis:   aspirin enteric coated 325 milliGRAM(s) Oral every 12 hours      RADIOLOGY & ADDITIONAL STUDIES:    ASSESSMENT AND PLAN:     - Analgesic pain control  - DVT prophylaxis:  mg twice a day     SCDs       - HO prophylaxis/Pain Management: Celebrex 200mg twice a day x 21 days  - PT/OT: Weight Bearing Status:  Weight bearing as tolerated, OOBTC           Anterior Hip precautions   -  Incentive spirometry  - Advance diet as tolerated  - Hospitalist is following  -  Follow up labs  -  Disposition: Subacute Rehab pending bed availability 
Patient is a 78y old  Female who presents with a chief complaint of Osteoarthritis of the right hip (23 May 2024 14:25)        HPI:S/P Right THR  POD 2      SUBJECTIVE & OBJECTIVE: Pt seen and examined at bedside. nad    PHYSICAL EXAM:  T(C): 36.7 (05-25-24 @ 08:38), Max: 36.7 (05-24-24 @ 15:10)  HR: 71 (05-25-24 @ 08:38) (69 - 80)  BP: 116/65 (05-25-24 @ 08:38) (103/61 - 125/73)  RR: 17 (05-25-24 @ 08:38) (16 - 18)  SpO2: 97% (05-25-24 @ 08:38) (93% - 97%)  Wt(kg): --   GENERAL: NAD, well-groomed, well-developed  HEAD:  Atraumatic, Normocephalic  EYES: EOMI, PERRLA, conjunctiva and sclera clear  ENMT: Moist mucous membranes  NECK: Supple, No JVD  NERVOUS SYSTEM:  Alert & Oriented X3, Motor Strength 5/5 B/L upper and lower extremities; DTRs 2+ intact and symmetric  CHEST/LUNG: Clear to auscultation bilaterally; No rales, rhonchi, wheezing, or rubs  HEART: Regular rate and rhythm; No murmurs, rubs, or gallops  ABDOMEN: Soft, Nontender, Nondistended; Bowel sounds present  EXTREMITIES:  2+ Peripheral Pulses, No clubbing, cyanosis, or edema        MEDICATIONS  (STANDING):  acetaminophen     Tablet .. 1000 milliGRAM(s) Oral every 8 hours  aspirin enteric coated 325 milliGRAM(s) Oral every 12 hours  atorvastatin 40 milliGRAM(s) Oral at bedtime  celecoxib 200 milliGRAM(s) Oral every 12 hours  lactated ringers. 1000 milliLiter(s) (125 mL/Hr) IV Continuous <Continuous>  losartan 25 milliGRAM(s) Oral daily  pantoprazole    Tablet 40 milliGRAM(s) Oral before breakfast  polyethylene glycol 3350 17 Gram(s) Oral at bedtime  senna 2 Tablet(s) Oral at bedtime    MEDICATIONS  (PRN):  aluminum hydroxide/magnesium hydroxide/simethicone Suspension 30 milliLiter(s) Oral four times a day PRN Indigestion  bisacodyl Suppository 10 milliGRAM(s) Rectal once PRN Constipation  HYDROmorphone  Injectable 0.5 milliGRAM(s) IV Push every 3 hours PRN Breakthrough pain  magnesium hydroxide Suspension 30 milliLiter(s) Oral daily PRN Constipation  ondansetron Injectable 4 milliGRAM(s) IV Push every 6 hours PRN Nausea and/or Vomiting  oxyCODONE    IR 10 milliGRAM(s) Oral every 3 hours PRN Severe Pain (7 - 10)  oxyCODONE    IR 5 milliGRAM(s) Oral every 3 hours PRN Moderate Pain (4 - 6)      LABS:                        10.0   11.37 )-----------( 156      ( 25 May 2024 06:49 )             31.0     05-25    143  |  108  |  17  ----------------------------<  105<H>  4.3   |  28  |  0.79    Ca    9.2      25 May 2024 06:49        Urinalysis Basic - ( 25 May 2024 06:49 )    Color: x / Appearance: x / SG: x / pH: x  Gluc: 105 mg/dL / Ketone: x  / Bili: x / Urobili: x   Blood: x / Protein: x / Nitrite: x   Leuk Esterase: x / RBC: x / WBC x   Sq Epi: x / Non Sq Epi: x / Bacteria: x        CAPILLARY BLOOD GLUCOSE          CAPILLARY BLOOD GLUCOSE        CAPILLARY BLOOD GLUCOSE                RECENT CULTURES:      RADIOLOGY & ADDITIONAL TESTS:                        DVT/GI ppx  Discussed with pt @ bedside

## 2024-05-25 NOTE — PROGRESS NOTE ADULT - ASSESSMENT
78F HTN, HLD and OA admitted for aftercare following Right THR    S/P Right THR  POD 2  Continue Bowel and pain control regimen;    Incentive Spirometer for lung expansion;   Monitor Hgb and follow up electrolytes.      Light Headedness  Combination of Anesthesia and possible pain medicatons  Component of Anemia but not severe enough to be symptomatic  Given IVF and will give her some more time and follow up    HTN / HLD   Hold Telmisartan until POD2  Continue Statin    Diet  Regular    DVT Prophylaxis   Aspirin BID    Disposition  Discharge planning pending hospital course

## 2024-05-25 NOTE — SOCIAL WORK PROGRESS NOTE - NSSWPROGRESSNOTE_GEN_ALL_CORE
Tx team is recommending subacute rehab. SW met with the pt and her  who requested [facility]. Referral made and the pt was accepted to [facility]. Per Celsa of [facility], the pt does not require a 3 night stay. Pt is scheduled for dc to [facility] at 1pm today via ambulette. Pt and her  were given ambulette information.

## 2024-05-30 ENCOUNTER — TRANSCRIPTION ENCOUNTER (OUTPATIENT)
Age: 79
End: 2024-05-30

## 2024-05-31 ENCOUNTER — NON-APPOINTMENT (OUTPATIENT)
Age: 79
End: 2024-05-31

## 2024-06-03 ENCOUNTER — NON-APPOINTMENT (OUTPATIENT)
Age: 79
End: 2024-06-03

## 2024-06-03 ENCOUNTER — TRANSCRIPTION ENCOUNTER (OUTPATIENT)
Age: 79
End: 2024-06-03

## 2024-06-04 ENCOUNTER — TRANSCRIPTION ENCOUNTER (OUTPATIENT)
Age: 79
End: 2024-06-04

## 2024-06-04 PROBLEM — I34.1 NONRHEUMATIC MITRAL (VALVE) PROLAPSE: Chronic | Status: ACTIVE | Noted: 2024-05-08

## 2024-06-04 PROBLEM — M16.11 UNILATERAL PRIMARY OSTEOARTHRITIS, RIGHT HIP: Chronic | Status: ACTIVE | Noted: 2024-05-08

## 2024-06-04 PROBLEM — I87.2 VENOUS INSUFFICIENCY (CHRONIC) (PERIPHERAL): Chronic | Status: ACTIVE | Noted: 2024-05-08

## 2024-06-05 ENCOUNTER — APPOINTMENT (OUTPATIENT)
Dept: ORTHOPEDIC SURGERY | Facility: CLINIC | Age: 79
End: 2024-06-05

## 2024-06-05 ENCOUNTER — APPOINTMENT (OUTPATIENT)
Dept: ORTHOPEDIC SURGERY | Facility: CLINIC | Age: 79
End: 2024-06-05
Payer: COMMERCIAL

## 2024-06-05 VITALS — WEIGHT: 156 LBS | HEIGHT: 66 IN | BODY MASS INDEX: 25.07 KG/M2

## 2024-06-05 DIAGNOSIS — Z96.641 PRESENCE OF RIGHT ARTIFICIAL HIP JOINT: ICD-10-CM

## 2024-06-05 PROCEDURE — 99213 OFFICE O/P EST LOW 20 MIN: CPT

## 2024-06-05 PROCEDURE — 73502 X-RAY EXAM HIP UNI 2-3 VIEWS: CPT

## 2024-06-05 NOTE — HISTORY OF PRESENT ILLNESS
[Retired] : Work status: retired [de-identified] : Date of surgery right hip May 23, 2024 Patient is 13 days status post right anterior hip replacement by Dr. Jaxson Rossi.  She is doing very well.  She has mild occasional pain right hip.  She has been doing outpatient PT at [facility].  Taking aspirin 320,000 mg for DVT prophylaxis.  No calf pain or swelling.  Seen today with her daughter  [] : no [FreeTextEntry1] : R Hip  [de-identified] : Dr. Rossi [de-identified] : 2/8/2024 [de-identified] : 5/23/2024 [de-identified] : 5/23/2024 [de-identified] : RT YUE

## 2024-06-05 NOTE — PHYSICAL EXAM
[Normal Mood and Affect] : normal mood and affect [Able to Communicate] : able to communicate [Well Developed] : well developed [Well Nourished] : well nourished [de-identified] : The patient ambulates without difficulty with walker  Right hip Mild swelling with slight ecchymosis Anterior incision is clean dry and intact Mild tenderness No pain with passive motion of the hip  Right leg No swelling Calf is soft and nontender [Right] : right hip with pelvis [FreeTextEntry9] : Reviewed and interpreted.  Pelvis AP with lateral right hip-status post noncemented right total hip replacement in excellent position.  Mild degenerative changes of the left hip

## 2024-06-05 NOTE — DISCUSSION/SUMMARY
[de-identified] : The patient will continue ambulation with a walker weightbearing as tolerated Continue PT at [facility] Continue Tylenol for pain Continue aspirin 325 mg daily with food along with omeprazole 20 mg daily for DVT prophylaxis Total hip precautions discussed Follow-up with Dr. Jaxson Rossi in 1 week  Impression: Status post right total hip replacement May 23, 2024

## 2024-06-07 ENCOUNTER — APPOINTMENT (OUTPATIENT)
Dept: CARE COORDINATION | Facility: HOME HEALTH | Age: 79
End: 2024-06-07
Payer: MEDICARE

## 2024-06-07 DIAGNOSIS — I10 ESSENTIAL (PRIMARY) HYPERTENSION: ICD-10-CM

## 2024-06-07 DIAGNOSIS — E78.5 HYPERLIPIDEMIA, UNSPECIFIED: ICD-10-CM

## 2024-06-07 DIAGNOSIS — Z96.649 PRESENCE OF UNSPECIFIED ARTIFICIAL HIP JOINT: ICD-10-CM

## 2024-06-07 PROCEDURE — 99024 POSTOP FOLLOW-UP VISIT: CPT

## 2024-06-09 VITALS
RESPIRATION RATE: 17 BRPM | HEART RATE: 70 BPM | DIASTOLIC BLOOD PRESSURE: 100 MMHG | OXYGEN SATURATION: 97 % | SYSTOLIC BLOOD PRESSURE: 160 MMHG

## 2024-06-09 PROBLEM — I10 HYPERTENSION: Status: ACTIVE | Noted: 2023-10-12

## 2024-06-09 PROBLEM — Z96.649 S/P HIP REPLACEMENT: Status: ACTIVE | Noted: 2024-06-09

## 2024-06-09 PROBLEM — E78.5 HYPERLIPIDEMIA, UNSPECIFIED HYPERLIPIDEMIA TYPE: Status: ACTIVE | Noted: 2023-10-12

## 2024-06-09 RX ORDER — ASPIRIN 325 MG/1
325 TABLET, FILM COATED ORAL TWICE DAILY
Refills: 0 | Status: ACTIVE | COMMUNITY
Start: 2024-06-09

## 2024-06-09 RX ORDER — TELMISARTAN 20 MG/1
20 TABLET ORAL DAILY
Refills: 0 | Status: ACTIVE | COMMUNITY
Start: 2024-05-31

## 2024-06-09 RX ORDER — CELECOXIB 200 MG/1
200 CAPSULE ORAL TWICE DAILY
Refills: 0 | Status: ACTIVE | COMMUNITY
Start: 2024-05-24

## 2024-06-09 RX ORDER — OXYCODONE 5 MG/1
5 TABLET ORAL EVERY 4 HOURS
Refills: 0 | Status: ACTIVE | COMMUNITY
Start: 2024-05-24

## 2024-06-09 RX ORDER — NAPROXEN 500 MG/1
500 TABLET ORAL
Qty: 40 | Refills: 0 | Status: DISCONTINUED | COMMUNITY
Start: 2023-10-12 | End: 2024-06-09

## 2024-06-09 RX ORDER — OMEPRAZOLE 20 MG/1
20 CAPSULE, DELAYED RELEASE ORAL
Qty: 30 | Refills: 0 | Status: ACTIVE | COMMUNITY
Start: 2024-05-24

## 2024-06-09 RX ORDER — MUPIROCIN 20 MG/G
2 OINTMENT TOPICAL
Qty: 22 | Refills: 0 | Status: DISCONTINUED | COMMUNITY
Start: 2024-05-09

## 2024-06-09 RX ORDER — ROSUVASTATIN CALCIUM 10 MG/1
10 TABLET, FILM COATED ORAL DAILY
Refills: 0 | Status: ACTIVE | COMMUNITY
Start: 2024-02-29

## 2024-06-09 RX ORDER — EZETIMIBE 10 MG/1
10 TABLET ORAL
Refills: 0 | Status: ACTIVE | COMMUNITY

## 2024-06-09 NOTE — HISTORY OF PRESENT ILLNESS
[Post-hospitalization from ___ Hospital] : Post-hospitalization from [unfilled] Hospital [Admitted on: ___] : The patient was admitted on [unfilled] [Discharged on ___] : discharged on [unfilled] [Discharge Summary] : discharge summary [Discharge Med List] : discharge medication list [Med Reconciliation] : medication reconciliation has been completed [Patient Contacted By: ____] : and contacted by [unfilled] [FreeTextEntry3] : Patient was contacted by TCM RN on 5/30/24 for 24/48 hour call and documentation is present in the Clinical Viewer  [FreeTextEntry2] :  78 y.o. female with severe osteoarthritis of the right hip. After admission on 5/23/24 and receiving pre-operative parenteral prophylactic antibiotics, the patient  underwent an  uncomplicated right anterior total hip replacement by Dr. Rossi.  On day of dc, pt had difficulty ambulating and went to [facility].  Dc'd last Friday with outpt PT.  Surgical site looks CDI, saw Dr. Cary 6/5.  BP elevated at timeo of visit, asymptomatic, did not take BP med this AM and will now.  TCM numbers provided for any questions/concerns.

## 2024-06-09 NOTE — PHYSICAL EXAM
[No Acute Distress] : no acute distress [Normal Sclera/Conjunctiva] : normal sclera/conjunctiva [Normal Outer Ear/Nose] : the outer ears and nose were normal in appearance [No JVD] : no jugular venous distention [No Respiratory Distress] : no respiratory distress  [Clear to Auscultation] : lungs were clear to auscultation bilaterally [Normal Rate] : normal rate  [Soft] : abdomen soft [Normal Gait] : normal gait [Coordination Grossly Intact] : coordination grossly intact [No Focal Deficits] : no focal deficits [Normal Affect] : the affect was normal [Normal Insight/Judgement] : insight and judgment were intact [de-identified] : BLLE edema  [de-identified] : right hip surgical site CDI

## 2024-06-09 NOTE — REVIEW OF SYSTEMS
[Joint Swelling] : joint swelling [Negative] : Heme/Lymph [de-identified] : right hip surgical incision

## 2024-06-13 ENCOUNTER — APPOINTMENT (OUTPATIENT)
Dept: ORTHOPEDIC SURGERY | Facility: CLINIC | Age: 79
End: 2024-06-13
Payer: MEDICARE

## 2024-06-13 DIAGNOSIS — M16.11 UNILATERAL PRIMARY OSTEOARTHRITIS, RIGHT HIP: ICD-10-CM

## 2024-06-13 PROCEDURE — 99024 POSTOP FOLLOW-UP VISIT: CPT

## 2024-06-13 NOTE — DISCUSSION/SUMMARY
[de-identified] : She will continue PT WBAT with anterior hip precautions. She will continue the ASA and TEDS for 1 more week.  She has not been using the TEDS lately.  She will f/u in 2 mos.

## 2024-06-13 NOTE — HISTORY OF PRESENT ILLNESS
[de-identified] : following up for the right hip. Patient is s/p R Anterior YUE 5/23/2024..  Patient states the hip is doing well post op. Denies any fever chills or drainage.  She notes some tingling lateral to the incision. She has been working with PT and progrssing well. She has been ambulating with walker.

## 2024-06-13 NOTE — PHYSICAL EXAM
[de-identified] : Constitutional: The patient appears well developed, well nourished.       Neurologic: Coordination is normal. Alert and oriented to time, place and person. No evidence of mood disorder, calm affect.         RIGHT    HIP/THIGH:  Inspection of the hip/thigh is as follows: Inspection shows mild swelling,  NO ecchymosis laterally and NO ecchymosis over buttock. Inspection shows no erythema and no rashes.    Inspection of the wound reveals clean and dry incision, no fluctuance, no sign of infection, no erythema and no drainage. Adhesive mesh removed. Wound C/D/I       Palpation of the hip/thigh is as follows: Thigh/calf soft NT       Range of motion of the hip is as follows in degrees:    Flexion:  90    Abduction:  20   External rotation:  30      Strength testing of the hip/thigh is as follows:    Hip flexion strength:   5/5   Hip extension strength:  5-/5    Hip abduction strength:  5/5     Hip adduction strength:  5/5      Neurological testing of the hip/thigh is as follows: motor exam 5/5 throughout, light touch intact throughout and no focal motor defecits.       Gait and function is as follows: uses walker.       Vascularity of the hip/thigh is as follows: Dorsalis pedis 2+ and Posterior tibial 2+      [Right] : right hip with pelvis [Components well fixed, in good position] : Components well fixed, in good position

## 2024-06-18 ENCOUNTER — TRANSCRIPTION ENCOUNTER (OUTPATIENT)
Age: 79
End: 2024-06-18

## 2024-07-02 ENCOUNTER — NON-APPOINTMENT (OUTPATIENT)
Age: 79
End: 2024-07-02

## 2024-07-03 ENCOUNTER — TRANSCRIPTION ENCOUNTER (OUTPATIENT)
Age: 79
End: 2024-07-03

## 2024-07-17 ENCOUNTER — TRANSCRIPTION ENCOUNTER (OUTPATIENT)
Age: 79
End: 2024-07-17

## 2024-07-31 ENCOUNTER — TRANSCRIPTION ENCOUNTER (OUTPATIENT)
Age: 79
End: 2024-07-31

## 2024-08-19 ENCOUNTER — APPOINTMENT (OUTPATIENT)
Dept: OTOLARYNGOLOGY | Facility: CLINIC | Age: 79
End: 2024-08-19
Payer: MEDICARE

## 2024-08-19 VITALS
BODY MASS INDEX: 25.13 KG/M2 | HEIGHT: 66 IN | WEIGHT: 156.38 LBS | SYSTOLIC BLOOD PRESSURE: 117 MMHG | DIASTOLIC BLOOD PRESSURE: 70 MMHG | HEART RATE: 75 BPM

## 2024-08-19 DIAGNOSIS — J31.0 CHRONIC RHINITIS: ICD-10-CM

## 2024-08-19 DIAGNOSIS — J34.2 DEVIATED NASAL SEPTUM: ICD-10-CM

## 2024-08-19 DIAGNOSIS — H60.63 UNSPECIFIED CHRONIC OTITIS EXTERNA, BILATERAL: ICD-10-CM

## 2024-08-19 DIAGNOSIS — H91.8X3 OTHER SPECIFIED HEARING LOSS, BILATERAL: ICD-10-CM

## 2024-08-19 PROCEDURE — 99213 OFFICE O/P EST LOW 20 MIN: CPT

## 2024-08-19 PROCEDURE — 92557 COMPREHENSIVE HEARING TEST: CPT

## 2024-08-19 PROCEDURE — 92567 TYMPANOMETRY: CPT

## 2024-08-19 NOTE — ADDENDUM
[FreeTextEntry1] : Documented by Lore Goode acting as scribe for Dr. Mccormick on 08/19/2024 All Medical record entries made by the Scribe were at my, Dr. Mccormick, direction and personally dictated by me on 08/19/2024  . I have reviewed the chart and agree that the record accurately reflects my personal performance of the history, physical exam, assessment and plan. I have also personally directed, reviewed, and agreed with the discharge instructions.

## 2024-08-19 NOTE — HISTORY OF PRESENT ILLNESS
[de-identified] : Pt. with h/o PND, ear popping, and ear fullness presents today for a follow up. Patient states she hasn't used the nasal sprays recently because her ears no longer feel clogged. Patient states her hearing has been stable.

## 2024-08-19 NOTE — PHYSICAL EXAM
[Hearing Nguyen Test (Tuning Fork On Forehead)] : no lateralization of tone [] : septum deviated to the right [Midline] : trachea located in midline position [Normal] : no rashes [FreeTextEntry8] :  collapsing ear canal. cerumen removed via curettage  [FreeTextEntry9] : cerumen removed via curettage  [de-identified] : positive kavitha on the left [de-identified] : mildly inflamed turbinates  [de-identified] : class 1 [de-identified] : ngated thin uvula [FreeTextEntry2] : sensations intact. sinuses nontender to percussion

## 2024-08-19 NOTE — ASSESSMENT
[FreeTextEntry1] : Reviewed and reconciled medications, allergies, PMHx, PSHx, SocHx, FMHx   Pt. with h/o PND, ear popping, and ear fullness presents today for a follow up. Patient states she hasn't used the nasal sprays recently because her ears no longer feel clogged. Patient states her hearing has been stable.   Physical Exam: -NOSE score: 0 -TNSS: 0 -right ear: collapsing ear canal. cerumen removed via curettage  -left ear: cerumen removed via curettage  -deviated septum right -mildly inflamed turbinates -positive kavitha on the left -tonsils class 1 -elongated thin uvula   Audio: -right ear: 96% at 65 dB -left ear: 100% at 60 dB -slight congestion in both ears but hearing is a lot better than prior audio  Plan: Audio - results interpreted by Dr. Mccormick and reviewed with the patient. FU 6 months

## 2024-08-22 ENCOUNTER — APPOINTMENT (OUTPATIENT)
Dept: ORTHOPEDIC SURGERY | Facility: CLINIC | Age: 79
End: 2024-08-22
Payer: MEDICARE

## 2024-08-22 VITALS — BODY MASS INDEX: 24.91 KG/M2 | WEIGHT: 155 LBS | HEIGHT: 66 IN

## 2024-08-22 DIAGNOSIS — M70.61 TROCHANTERIC BURSITIS, RIGHT HIP: ICD-10-CM

## 2024-08-22 DIAGNOSIS — M16.11 UNILATERAL PRIMARY OSTEOARTHRITIS, RIGHT HIP: ICD-10-CM

## 2024-08-22 DIAGNOSIS — M70.62 TROCHANTERIC BURSITIS, RIGHT HIP: ICD-10-CM

## 2024-08-22 PROCEDURE — 99213 OFFICE O/P EST LOW 20 MIN: CPT | Mod: 25

## 2024-08-22 PROCEDURE — 73502 X-RAY EXAM HIP UNI 2-3 VIEWS: CPT

## 2024-08-22 RX ORDER — MELOXICAM 7.5 MG/1
7.5 TABLET ORAL
Qty: 30 | Refills: 0 | Status: ACTIVE | COMMUNITY
Start: 2024-08-22 | End: 1900-01-01

## 2024-08-22 NOTE — PHYSICAL EXAM
[Right] : right hip with pelvis [Components well fixed, in good position] : Components well fixed, in good position [de-identified] : Constitutional: The patient appears well developed, well nourished.       Neurologic: Coordination is normal. Alert and oriented to time, place and person. No evidence of mood disorder, calm affect.         RIGHT    HIP/THIGH:  Inspection of the hip/thigh is as follows: Inspection shows mild swelling,  NO ecchymosis laterally and NO ecchymosis over buttock. Inspection shows no erythema and no rashes.    Inspection of the wound reveals clean and dry incision, no fluctuance, no sign of infection, no erythema and no drainage. Adhesive mesh removed. Wound C/D/I       Palpation of the hip/thigh is as follows: Thigh/calf soft NT       Range of motion of the hip is as follows in degrees:    Flexion:  90    Abduction:  20   External rotation:  30      Strength testing of the hip/thigh is as follows:    Hip flexion strength:   5/5   Hip extension strength:  5-/5    Hip abduction strength:  5/5     Hip adduction strength:  5/5      Neurological testing of the hip/thigh is as follows: motor exam 5/5 throughout, light touch intact throughout and no focal motor defecits.       Gait and function is as follows: uses walker.       Vascularity of the hip/thigh is as follows: Dorsalis pedis 2+ and Posterior tibial 2+

## 2024-08-22 NOTE — HISTORY OF PRESENT ILLNESS
[de-identified] : Follow up for the right hip. Patient is s/p R Anterior YUE 5/23/2024. Patient does have trochanteric bursitis

## 2024-08-22 NOTE — DISCUSSION/SUMMARY
[de-identified] : At this time the patient is progressing well. Patient demonstrates improvements in both range of motion and strength. Took time to discuss with patient the importance of maintaining ROM and Strength through daily HEP. Patient expressed understanding of this and will continue HEP. At this time the patient can continue PT as needed. Discussed importance of patient continuing the exercises on their own as well.  Patient was instructed to take antibiotic prophylaxis prior to any dental or GI procedures.   Patient was given a prescription for Mobic 7.5mg. Time was taken to discuss the importance of proper prescription drug management. They were instructed to take this daily for the next four weeks. There is a moderate risk of morbidity with further treatment, especially from use of prescription strength medication and possible side effects of the medication. Patient was advised that they should continue the Rx for the full four weeks even if their symptoms dissipate. The patient was also warned of potential risks/side effects of the medication. These potential risks include bruising, gastrointestinal bleed, gastrointestinal burning, allergic reaction and reduced blood clotting. The patient expressed verbal understanding. I recommend that the patient follow-up with their medical physician to discuss any significant specific potential issues with long term use such as interactions with current medications or with exacerbation of underlying medical morbidities.  The following information has been documented by Alissa Martinez acting as a scribe. Dr. Rossi Attestation The documentation recorded by the scribe, in my presence, accurately reflects the service I, Dr. Rossi, personally performed, and the decisions made by me with my edits as appropriate.

## 2024-08-28 ENCOUNTER — TRANSCRIPTION ENCOUNTER (OUTPATIENT)
Age: 79
End: 2024-08-28

## 2024-09-20 ENCOUNTER — NON-APPOINTMENT (OUTPATIENT)
Age: 79
End: 2024-09-20

## 2024-09-26 ENCOUNTER — RX RENEWAL (OUTPATIENT)
Age: 79
End: 2024-09-26

## 2024-10-24 ENCOUNTER — APPOINTMENT (OUTPATIENT)
Dept: ORTHOPEDIC SURGERY | Facility: CLINIC | Age: 79
End: 2024-10-24
Payer: MEDICARE

## 2024-10-24 DIAGNOSIS — M16.11 UNILATERAL PRIMARY OSTEOARTHRITIS, RIGHT HIP: ICD-10-CM

## 2024-10-24 PROCEDURE — 99213 OFFICE O/P EST LOW 20 MIN: CPT

## 2024-11-07 ENCOUNTER — NON-APPOINTMENT (OUTPATIENT)
Age: 79
End: 2024-11-07

## 2024-12-12 ENCOUNTER — APPOINTMENT (OUTPATIENT)
Dept: ORTHOPEDIC SURGERY | Facility: CLINIC | Age: 79
End: 2024-12-12
Payer: MEDICARE

## 2024-12-12 DIAGNOSIS — Z96.641 PRESENCE OF RIGHT ARTIFICIAL HIP JOINT: ICD-10-CM

## 2024-12-12 DIAGNOSIS — M16.11 UNILATERAL PRIMARY OSTEOARTHRITIS, RIGHT HIP: ICD-10-CM

## 2024-12-12 DIAGNOSIS — M70.61 TROCHANTERIC BURSITIS, RIGHT HIP: ICD-10-CM

## 2024-12-12 PROCEDURE — 99214 OFFICE O/P EST MOD 30 MIN: CPT | Mod: 25

## 2024-12-12 PROCEDURE — 20610 DRAIN/INJ JOINT/BURSA W/O US: CPT | Mod: RT

## 2024-12-12 PROCEDURE — 73502 X-RAY EXAM HIP UNI 2-3 VIEWS: CPT

## 2025-01-23 ENCOUNTER — APPOINTMENT (OUTPATIENT)
Dept: ORTHOPEDIC SURGERY | Facility: CLINIC | Age: 80
End: 2025-01-23
Payer: MEDICARE

## 2025-01-23 DIAGNOSIS — Z96.641 PRESENCE OF RIGHT ARTIFICIAL HIP JOINT: ICD-10-CM

## 2025-01-23 DIAGNOSIS — M16.11 UNILATERAL PRIMARY OSTEOARTHRITIS, RIGHT HIP: ICD-10-CM

## 2025-01-23 PROCEDURE — 99213 OFFICE O/P EST LOW 20 MIN: CPT

## 2025-02-01 NOTE — PHYSICAL THERAPY INITIAL EVALUATION ADULT - GAIT TRAINING, PT EVAL
Patient will ambulate 150 feet independently with a rolling walker within 1-2 days for safe community ambulation. Patient will ascend/descend 3 stairs independently with +HR and straight cane within 1-2 days for safe household stair negotiation. No

## 2025-03-19 ENCOUNTER — NON-APPOINTMENT (OUTPATIENT)
Age: 80
End: 2025-03-19

## 2025-04-07 ENCOUNTER — APPOINTMENT (OUTPATIENT)
Dept: OTOLARYNGOLOGY | Facility: CLINIC | Age: 80
End: 2025-04-07

## 2025-05-02 ENCOUNTER — NON-APPOINTMENT (OUTPATIENT)
Age: 80
End: 2025-05-02

## 2025-05-02 ENCOUNTER — APPOINTMENT (OUTPATIENT)
Dept: OTOLARYNGOLOGY | Facility: CLINIC | Age: 80
End: 2025-05-02
Payer: MEDICARE

## 2025-05-02 VITALS
HEIGHT: 66 IN | WEIGHT: 159 LBS | SYSTOLIC BLOOD PRESSURE: 121 MMHG | DIASTOLIC BLOOD PRESSURE: 71 MMHG | BODY MASS INDEX: 25.55 KG/M2 | HEART RATE: 81 BPM

## 2025-05-02 DIAGNOSIS — J31.0 CHRONIC RHINITIS: ICD-10-CM

## 2025-05-02 DIAGNOSIS — H91.8X3 OTHER SPECIFIED HEARING LOSS, BILATERAL: ICD-10-CM

## 2025-05-02 DIAGNOSIS — H60.63 UNSPECIFIED CHRONIC OTITIS EXTERNA, BILATERAL: ICD-10-CM

## 2025-05-02 DIAGNOSIS — L73.9 FOLLICULAR DISORDER, UNSPECIFIED: ICD-10-CM

## 2025-05-02 DIAGNOSIS — J34.2 DEVIATED NASAL SEPTUM: ICD-10-CM

## 2025-05-02 PROCEDURE — 99214 OFFICE O/P EST MOD 30 MIN: CPT

## 2025-05-02 RX ORDER — MUPIROCIN 20 MG/G
2 OINTMENT TOPICAL 3 TIMES DAILY
Qty: 3 | Refills: 3 | Status: ACTIVE | COMMUNITY
Start: 2025-05-02 | End: 1900-01-01

## 2025-05-02 RX ORDER — FLUTICASONE PROPIONATE 50 UG/1
50 SPRAY, METERED NASAL
Qty: 1 | Refills: 3 | Status: ACTIVE | COMMUNITY
Start: 2025-05-02 | End: 1900-01-01

## 2025-05-08 ENCOUNTER — APPOINTMENT (OUTPATIENT)
Dept: ORTHOPEDIC SURGERY | Facility: CLINIC | Age: 80
End: 2025-05-08
Payer: MEDICARE

## 2025-05-08 DIAGNOSIS — M16.11 UNILATERAL PRIMARY OSTEOARTHRITIS, RIGHT HIP: ICD-10-CM

## 2025-05-08 DIAGNOSIS — M54.2 CERVICALGIA: ICD-10-CM

## 2025-05-08 PROCEDURE — 99213 OFFICE O/P EST LOW 20 MIN: CPT

## 2025-05-08 PROCEDURE — 72040 X-RAY EXAM NECK SPINE 2-3 VW: CPT

## 2025-05-08 PROCEDURE — 73502 X-RAY EXAM HIP UNI 2-3 VIEWS: CPT

## 2025-07-03 ENCOUNTER — NON-APPOINTMENT (OUTPATIENT)
Age: 80
End: 2025-07-03

## (undated) DEVICE — SOL IRR POUR H2O 1500ML

## (undated) DEVICE — SOLIDIFIER CANN EXPRESS 3K

## (undated) DEVICE — SUT PDS II 1 27" CT-1

## (undated) DEVICE — SOL IRR POUR NS 0.9% 500ML

## (undated) DEVICE — SUT MONOCRYL 2-0 36" CT-1 UNDYED

## (undated) DEVICE — POSITIONER STRAP ARMBOARD VELCRO TS-30

## (undated) DEVICE — SYR ASEPTO

## (undated) DEVICE — GRIPPER MEDENVISION

## (undated) DEVICE — NDL HYPO SAFE 18G X 1.5" (PINK)

## (undated) DEVICE — SPECIMEN CONTAINER PET

## (undated) DEVICE — SUT VICRYL PLUS 1 27" CP UNDYED

## (undated) DEVICE — POSITIONER STIRRUP STRAP W SLIP RING 19X3.5"

## (undated) DEVICE — ELCTR PENCIL SMOKE EVACUATOR COATED PUSH BUTTON 70MM

## (undated) DEVICE — SYR LUER LOK 50CC

## (undated) DEVICE — POSITIONER HANA PATIENT CARE KIT POSITION SUPINE

## (undated) DEVICE — DRAPE C ARM 41X140"

## (undated) DEVICE — CANISTER SUCTION LID GUARD 3000CC

## (undated) DEVICE — POSITIONER PATIENT SAFETY STRAP 3X60"

## (undated) DEVICE — DRAPE XL SHEET 77X98"

## (undated) DEVICE — PACK TOTAL HIP

## (undated) DEVICE — HANDPIECE INTERPULSE W MULTI TIP

## (undated) DEVICE — DRSG STERISTRIPS 0.5 X 4"

## (undated) DEVICE — POSITIONER FOAM ABDUCTION PILLOW MED (PINK)

## (undated) DEVICE — HOOD FLYTE STRYKER HELMET SHIELD

## (undated) DEVICE — DRAPE UTILITY W TAPE 15X26"

## (undated) DEVICE — DRAPE MAYO STAND 30"

## (undated) DEVICE — STAPLER SKIN VISI-STAT 35 WIDE

## (undated) DEVICE — DRSG DERMABOND 0.7ML

## (undated) DEVICE — POSITIONER POSITIONING ROLL  5X17"

## (undated) DEVICE — SPECIMEN CONTAINER 4OZ

## (undated) DEVICE — SOL IRR BAG NS 0.9% 3000ML

## (undated) DEVICE — GLV 8 PROTEXIS (WHITE)

## (undated) DEVICE — GLV 7.5 PROTEXIS (WHITE)

## (undated) DEVICE — DRAPE SHOWER CURTAIN ISOLATION

## (undated) DEVICE — ELCTR AQUAMANTYS BIPOLAR SEALER 6.0

## (undated) DEVICE — BAG SPONGE COUNTER EZ

## (undated) DEVICE — DRSG SILVERLON ISLAND 4X6" 2X4" PAD

## (undated) DEVICE — SUT QUILL PDO 1 30CM T9 DA

## (undated) DEVICE — DRSG SILVERLON ISLAND 4X10" 2X8" PAD

## (undated) DEVICE — SUT PDS II 0 36" CT-1

## (undated) DEVICE — SAW BLADE STRYKER SAGITTAL 21.0X90X1.27MM

## (undated) DEVICE — POSITIONER FOAM HEAD CRADLE (PINK)

## (undated) DEVICE — WARMING BLANKET UPPER ADULT

## (undated) DEVICE — DRAPE TOP SHEET 53" X 101"

## (undated) DEVICE — DRILL BIT BIOMET RINGLOCK QUICK CONNECT 3.2MM X 30MM

## (undated) DEVICE — VENODYNE/SCD SLEEVE CALF MEDIUM